# Patient Record
Sex: FEMALE | Race: OTHER | URBAN - METROPOLITAN AREA
[De-identification: names, ages, dates, MRNs, and addresses within clinical notes are randomized per-mention and may not be internally consistent; named-entity substitution may affect disease eponyms.]

---

## 2023-05-12 ENCOUNTER — OFFICE VISIT (OUTPATIENT)
Dept: FAMILY MEDICINE CLINIC | Facility: CLINIC | Age: 38
End: 2023-05-12
Payer: COMMERCIAL

## 2023-05-12 VITALS
BODY MASS INDEX: 31.65 KG/M2 | TEMPERATURE: 98 F | SYSTOLIC BLOOD PRESSURE: 121 MMHG | HEIGHT: 62 IN | HEART RATE: 75 BPM | WEIGHT: 172 LBS | RESPIRATION RATE: 16 BRPM | DIASTOLIC BLOOD PRESSURE: 74 MMHG | OXYGEN SATURATION: 100 %

## 2023-05-12 DIAGNOSIS — J32.9 RHINOSINUSITIS: Primary | ICD-10-CM

## 2023-05-12 DIAGNOSIS — J31.0 RHINOSINUSITIS: Primary | ICD-10-CM

## 2023-05-12 DIAGNOSIS — R05.1 ACUTE COUGH: ICD-10-CM

## 2023-05-12 LAB
CONTROL LINE PRESENT WITH A CLEAR BACKGROUND (YES/NO): YES YES/NO
KIT LOT #: NORMAL NUMERIC
STREP GRP A CUL-SCR: NEGATIVE

## 2023-05-12 PROCEDURE — 87880 STREP A ASSAY W/OPTIC: CPT | Performed by: NURSE PRACTITIONER

## 2023-05-12 PROCEDURE — 3008F BODY MASS INDEX DOCD: CPT | Performed by: NURSE PRACTITIONER

## 2023-05-12 PROCEDURE — 3078F DIAST BP <80 MM HG: CPT | Performed by: NURSE PRACTITIONER

## 2023-05-12 PROCEDURE — 3074F SYST BP LT 130 MM HG: CPT | Performed by: NURSE PRACTITIONER

## 2023-05-12 PROCEDURE — 99203 OFFICE O/P NEW LOW 30 MIN: CPT | Performed by: NURSE PRACTITIONER

## 2023-05-12 RX ORDER — AMOXICILLIN AND CLAVULANATE POTASSIUM 875; 125 MG/1; MG/1
1 TABLET, FILM COATED ORAL 2 TIMES DAILY
Qty: 14 TABLET | Refills: 0 | Status: SHIPPED | OUTPATIENT
Start: 2023-05-12 | End: 2023-05-19

## 2023-05-12 RX ORDER — LORATADINE 10 MG/1
CAPSULE, LIQUID FILLED ORAL
COMMUNITY

## 2023-05-12 RX ORDER — BENZONATATE 200 MG/1
CAPSULE ORAL
Qty: 20 CAPSULE | Refills: 0 | Status: SHIPPED | OUTPATIENT
Start: 2023-05-12

## 2023-07-07 ENCOUNTER — PATIENT MESSAGE (OUTPATIENT)
Dept: INTERNAL MEDICINE CLINIC | Facility: CLINIC | Age: 38
End: 2023-07-07

## 2023-07-07 ENCOUNTER — LAB ENCOUNTER (OUTPATIENT)
Dept: LAB | Age: 38
End: 2023-07-07
Payer: COMMERCIAL

## 2023-07-07 ENCOUNTER — OFFICE VISIT (OUTPATIENT)
Dept: INTERNAL MEDICINE CLINIC | Facility: CLINIC | Age: 38
End: 2023-07-07

## 2023-07-07 VITALS
DIASTOLIC BLOOD PRESSURE: 56 MMHG | SYSTOLIC BLOOD PRESSURE: 102 MMHG | BODY MASS INDEX: 31.28 KG/M2 | HEIGHT: 62 IN | OXYGEN SATURATION: 98 % | WEIGHT: 170 LBS | HEART RATE: 64 BPM

## 2023-07-07 DIAGNOSIS — Z00.00 ROUTINE GENERAL MEDICAL EXAMINATION AT A HEALTH CARE FACILITY: ICD-10-CM

## 2023-07-07 DIAGNOSIS — Z00.00 ROUTINE GENERAL MEDICAL EXAMINATION AT A HEALTH CARE FACILITY: Primary | ICD-10-CM

## 2023-07-07 PROBLEM — F41.1 GENERALIZED ANXIETY DISORDER: Status: ACTIVE | Noted: 2023-07-07

## 2023-07-07 LAB
ALBUMIN SERPL-MCNC: 3.8 G/DL (ref 3.4–5)
ALBUMIN/GLOB SERPL: 1.1 {RATIO} (ref 1–2)
ALP LIVER SERPL-CCNC: 80 U/L
ALT SERPL-CCNC: 61 U/L
ANION GAP SERPL CALC-SCNC: 5 MMOL/L (ref 0–18)
AST SERPL-CCNC: 36 U/L (ref 15–37)
BASOPHILS # BLD AUTO: 0.04 X10(3) UL (ref 0–0.2)
BASOPHILS NFR BLD AUTO: 0.7 %
BILIRUB SERPL-MCNC: 0.7 MG/DL (ref 0.1–2)
BUN BLD-MCNC: 15 MG/DL (ref 7–18)
BUN/CREAT SERPL: 28.8 (ref 10–20)
CALCIUM BLD-MCNC: 9 MG/DL (ref 8.5–10.1)
CHLORIDE SERPL-SCNC: 109 MMOL/L (ref 98–112)
CHOLEST SERPL-MCNC: 222 MG/DL (ref ?–200)
CO2 SERPL-SCNC: 26 MMOL/L (ref 21–32)
CREAT BLD-MCNC: 0.52 MG/DL
DEPRECATED RDW RBC AUTO: 38.5 FL (ref 35.1–46.3)
EOSINOPHIL # BLD AUTO: 0.1 X10(3) UL (ref 0–0.7)
EOSINOPHIL NFR BLD AUTO: 1.8 %
ERYTHROCYTE [DISTWIDTH] IN BLOOD BY AUTOMATED COUNT: 11.9 % (ref 11–15)
FASTING PATIENT LIPID ANSWER: YES
FASTING STATUS PATIENT QL REPORTED: YES
GFR SERPLBLD BASED ON 1.73 SQ M-ARVRAT: 121 ML/MIN/1.73M2 (ref 60–?)
GLOBULIN PLAS-MCNC: 3.5 G/DL (ref 2.8–4.4)
GLUCOSE BLD-MCNC: 98 MG/DL (ref 70–99)
HCT VFR BLD AUTO: 41.3 %
HDLC SERPL-MCNC: 57 MG/DL (ref 40–59)
HGB BLD-MCNC: 13.6 G/DL
IMM GRANULOCYTES # BLD AUTO: 0.01 X10(3) UL (ref 0–1)
IMM GRANULOCYTES NFR BLD: 0.2 %
LDLC SERPL CALC-MCNC: 142 MG/DL (ref ?–100)
LYMPHOCYTES # BLD AUTO: 1.81 X10(3) UL (ref 1–4)
LYMPHOCYTES NFR BLD AUTO: 33.5 %
MCH RBC QN AUTO: 29.1 PG (ref 26–34)
MCHC RBC AUTO-ENTMCNC: 32.9 G/DL (ref 31–37)
MCV RBC AUTO: 88.2 FL
MONOCYTES # BLD AUTO: 0.33 X10(3) UL (ref 0.1–1)
MONOCYTES NFR BLD AUTO: 6.1 %
NEUTROPHILS # BLD AUTO: 3.12 X10 (3) UL (ref 1.5–7.7)
NEUTROPHILS # BLD AUTO: 3.12 X10(3) UL (ref 1.5–7.7)
NEUTROPHILS NFR BLD AUTO: 57.7 %
NONHDLC SERPL-MCNC: 165 MG/DL (ref ?–130)
OSMOLALITY SERPL CALC.SUM OF ELEC: 291 MOSM/KG (ref 275–295)
PLATELET # BLD AUTO: 235 10(3)UL (ref 150–450)
POTASSIUM SERPL-SCNC: 4 MMOL/L (ref 3.5–5.1)
PROT SERPL-MCNC: 7.3 G/DL (ref 6.4–8.2)
RBC # BLD AUTO: 4.68 X10(6)UL
SODIUM SERPL-SCNC: 140 MMOL/L (ref 136–145)
TRIGL SERPL-MCNC: 130 MG/DL (ref 30–149)
TSI SER-ACNC: 1.61 MIU/ML (ref 0.36–3.74)
VLDLC SERPL CALC-MCNC: 24 MG/DL (ref 0–30)
WBC # BLD AUTO: 5.4 X10(3) UL (ref 4–11)

## 2023-07-07 PROCEDURE — 3074F SYST BP LT 130 MM HG: CPT

## 2023-07-07 PROCEDURE — 80061 LIPID PANEL: CPT

## 2023-07-07 PROCEDURE — 80053 COMPREHEN METABOLIC PANEL: CPT

## 2023-07-07 PROCEDURE — 3008F BODY MASS INDEX DOCD: CPT

## 2023-07-07 PROCEDURE — 36415 COLL VENOUS BLD VENIPUNCTURE: CPT

## 2023-07-07 PROCEDURE — 85025 COMPLETE CBC W/AUTO DIFF WBC: CPT

## 2023-07-07 PROCEDURE — 84443 ASSAY THYROID STIM HORMONE: CPT

## 2023-07-07 PROCEDURE — 3078F DIAST BP <80 MM HG: CPT

## 2023-07-07 PROCEDURE — 99385 PREV VISIT NEW AGE 18-39: CPT

## 2023-07-07 RX ORDER — LORATADINE 10 MG/1
10 TABLET ORAL DAILY
COMMUNITY

## 2023-07-07 RX ORDER — CITALOPRAM HYDROBROMIDE 10 MG/1
10 TABLET ORAL DAILY
COMMUNITY
Start: 2023-04-26

## 2023-07-08 DIAGNOSIS — E78.00 HIGH CHOLESTEROL: Primary | ICD-10-CM

## 2023-07-08 DIAGNOSIS — R79.89 ELEVATED LFTS: ICD-10-CM

## 2023-07-09 NOTE — TELEPHONE ENCOUNTER
From: Bolling Schlatter  To: LION Kim  Sent: 7/7/2023 5:08 PM CDT  Subject: Question regarding COMP METABOLIC PANEL (14)    Good afternoon Joanna Copping, was does the bun crea ratio mean. I saw that it was very high, also my glucose level almost on the line.

## 2023-11-22 ENCOUNTER — OFFICE VISIT (OUTPATIENT)
Dept: FAMILY MEDICINE CLINIC | Facility: CLINIC | Age: 38
End: 2023-11-22
Payer: COMMERCIAL

## 2023-11-22 ENCOUNTER — APPOINTMENT (OUTPATIENT)
Dept: CT IMAGING | Facility: HOSPITAL | Age: 38
End: 2023-11-22
Attending: EMERGENCY MEDICINE
Payer: COMMERCIAL

## 2023-11-22 ENCOUNTER — HOSPITAL ENCOUNTER (EMERGENCY)
Facility: HOSPITAL | Age: 38
Discharge: HOME | End: 2023-11-22
Attending: EMERGENCY MEDICINE
Payer: COMMERCIAL

## 2023-11-22 ENCOUNTER — APPOINTMENT (OUTPATIENT)
Dept: GENERAL RADIOLOGY | Facility: HOSPITAL | Age: 38
End: 2023-11-22
Attending: EMERGENCY MEDICINE
Payer: COMMERCIAL

## 2023-11-22 VITALS
HEART RATE: 70 BPM | SYSTOLIC BLOOD PRESSURE: 135 MMHG | BODY MASS INDEX: 31.28 KG/M2 | DIASTOLIC BLOOD PRESSURE: 70 MMHG | TEMPERATURE: 97 F | WEIGHT: 170 LBS | OXYGEN SATURATION: 96 % | RESPIRATION RATE: 18 BRPM | HEIGHT: 62 IN

## 2023-11-22 VITALS
SYSTOLIC BLOOD PRESSURE: 108 MMHG | WEIGHT: 170 LBS | BODY MASS INDEX: 32.1 KG/M2 | DIASTOLIC BLOOD PRESSURE: 78 MMHG | HEART RATE: 71 BPM | HEIGHT: 61 IN | TEMPERATURE: 98 F | RESPIRATION RATE: 20 BRPM | OXYGEN SATURATION: 98 %

## 2023-11-22 DIAGNOSIS — R10.11 RIGHT UPPER QUADRANT ABDOMINAL PAIN: Primary | ICD-10-CM

## 2023-11-22 DIAGNOSIS — R42 DIZZINESS: ICD-10-CM

## 2023-11-22 DIAGNOSIS — R11.0 NAUSEA: ICD-10-CM

## 2023-11-22 DIAGNOSIS — R07.9 RECURRENT CHEST PAIN: Primary | ICD-10-CM

## 2023-11-22 LAB
ALBUMIN SERPL-MCNC: 4.7 G/DL (ref 3.2–4.8)
ALP LIVER SERPL-CCNC: 82 U/L
ALT SERPL-CCNC: 57 U/L
ANION GAP SERPL CALC-SCNC: 5 MMOL/L (ref 0–18)
AST SERPL-CCNC: 39 U/L (ref ?–34)
B-HCG UR QL: NEGATIVE
BASOPHILS # BLD AUTO: 0.04 X10(3) UL (ref 0–0.2)
BASOPHILS NFR BLD AUTO: 0.5 %
BILIRUB DIRECT SERPL-MCNC: 0.2 MG/DL (ref ?–0.3)
BILIRUB SERPL-MCNC: 1 MG/DL (ref 0.3–1.2)
BUN BLD-MCNC: 11 MG/DL (ref 9–23)
BUN/CREAT SERPL: 22.9 (ref 10–20)
CALCIUM BLD-MCNC: 9.4 MG/DL (ref 8.7–10.4)
CHLORIDE SERPL-SCNC: 107 MMOL/L (ref 98–112)
CO2 SERPL-SCNC: 26 MMOL/L (ref 21–32)
CREAT BLD-MCNC: 0.48 MG/DL
D DIMER PPP FEU-MCNC: 1.03 UG/ML FEU (ref ?–0.5)
DEPRECATED RDW RBC AUTO: 37.7 FL (ref 35.1–46.3)
EGFRCR SERPLBLD CKD-EPI 2021: 123 ML/MIN/1.73M2 (ref 60–?)
EOSINOPHIL # BLD AUTO: 0.07 X10(3) UL (ref 0–0.7)
EOSINOPHIL NFR BLD AUTO: 1 %
ERYTHROCYTE [DISTWIDTH] IN BLOOD BY AUTOMATED COUNT: 11.8 % (ref 11–15)
GLUCOSE BLD-MCNC: 91 MG/DL (ref 70–99)
HCT VFR BLD AUTO: 42.4 %
HGB BLD-MCNC: 14.2 G/DL
IMM GRANULOCYTES # BLD AUTO: 0.02 X10(3) UL (ref 0–1)
IMM GRANULOCYTES NFR BLD: 0.3 %
INR BLD: 0.96 (ref 0.8–1.2)
LIPASE SERPL-CCNC: 35 U/L (ref 13–75)
LYMPHOCYTES # BLD AUTO: 2.25 X10(3) UL (ref 1–4)
LYMPHOCYTES NFR BLD AUTO: 30.7 %
MCH RBC QN AUTO: 29.1 PG (ref 26–34)
MCHC RBC AUTO-ENTMCNC: 33.5 G/DL (ref 31–37)
MCV RBC AUTO: 86.9 FL
MONOCYTES # BLD AUTO: 0.43 X10(3) UL (ref 0.1–1)
MONOCYTES NFR BLD AUTO: 5.9 %
NEUTROPHILS # BLD AUTO: 4.53 X10 (3) UL (ref 1.5–7.7)
NEUTROPHILS # BLD AUTO: 4.53 X10(3) UL (ref 1.5–7.7)
NEUTROPHILS NFR BLD AUTO: 61.6 %
OSMOLALITY SERPL CALC.SUM OF ELEC: 285 MOSM/KG (ref 275–295)
PLATELET # BLD AUTO: 234 10(3)UL (ref 150–450)
POTASSIUM SERPL-SCNC: 3.6 MMOL/L (ref 3.5–5.1)
PROT SERPL-MCNC: 7.7 G/DL (ref 5.7–8.2)
PROTHROMBIN TIME: 13.3 SECONDS (ref 11.6–14.8)
RBC # BLD AUTO: 4.88 X10(6)UL
SODIUM SERPL-SCNC: 138 MMOL/L (ref 136–145)
TROPONIN I SERPL HS-MCNC: <3 NG/L
WBC # BLD AUTO: 7.3 X10(3) UL (ref 4–11)

## 2023-11-22 PROCEDURE — 84484 ASSAY OF TROPONIN QUANT: CPT | Performed by: EMERGENCY MEDICINE

## 2023-11-22 PROCEDURE — 81025 URINE PREGNANCY TEST: CPT

## 2023-11-22 PROCEDURE — 85025 COMPLETE CBC W/AUTO DIFF WBC: CPT | Performed by: EMERGENCY MEDICINE

## 2023-11-22 PROCEDURE — 83690 ASSAY OF LIPASE: CPT | Performed by: EMERGENCY MEDICINE

## 2023-11-22 PROCEDURE — 80048 BASIC METABOLIC PNL TOTAL CA: CPT | Performed by: EMERGENCY MEDICINE

## 2023-11-22 PROCEDURE — 93010 ELECTROCARDIOGRAM REPORT: CPT

## 2023-11-22 PROCEDURE — 80076 HEPATIC FUNCTION PANEL: CPT | Performed by: EMERGENCY MEDICINE

## 2023-11-22 PROCEDURE — 71275 CT ANGIOGRAPHY CHEST: CPT | Performed by: EMERGENCY MEDICINE

## 2023-11-22 PROCEDURE — 85379 FIBRIN DEGRADATION QUANT: CPT | Performed by: EMERGENCY MEDICINE

## 2023-11-22 PROCEDURE — 74175 CTA ABDOMEN W/CONTRAST: CPT | Performed by: EMERGENCY MEDICINE

## 2023-11-22 PROCEDURE — 93005 ELECTROCARDIOGRAM TRACING: CPT

## 2023-11-22 PROCEDURE — 99285 EMERGENCY DEPT VISIT HI MDM: CPT

## 2023-11-22 PROCEDURE — 36415 COLL VENOUS BLD VENIPUNCTURE: CPT

## 2023-11-22 PROCEDURE — 71046 X-RAY EXAM CHEST 2 VIEWS: CPT | Performed by: EMERGENCY MEDICINE

## 2023-11-22 PROCEDURE — 85610 PROTHROMBIN TIME: CPT | Performed by: EMERGENCY MEDICINE

## 2023-11-22 NOTE — DISCHARGE INSTRUCTIONS
Return to emergency department for any worsening symptoms including but not limited to worsening chest pain, shortness of breath, worsening symptoms with exertion, lower extremity swelling, weakness, numbness, abdominal pain, etc. Please follow with your primary care provider in the next few days for reevaluation of your symptoms. Return to emergency department for any worsening symptoms including but not limited to worsening chest pain, shortness of breath, worsening symptoms with exertion, lower extremity swelling, weakness, numbness, abdominal pain, etc. Please follow with your primary care provider in the next few days for reevaluation of your symptoms. The Emergency Department is not intended to be a substitute for an effort to provide complete medical care. The imaging, if any, have often been interpreted on a preliminary basis pending final reading by the radiologist. If your blood pressure was greater than 140/90, please have this blood pressure rechecked by your primary care provider in the next several days.

## 2023-11-24 LAB
ATRIAL RATE: 61 BPM
P AXIS: 32 DEGREES
P-R INTERVAL: 146 MS
Q-T INTERVAL: 466 MS
QRS DURATION: 96 MS
QTC CALCULATION (BEZET): 469 MS
R AXIS: -14 DEGREES
T AXIS: 8 DEGREES
VENTRICULAR RATE: 61 BPM

## 2023-11-28 ENCOUNTER — OFFICE VISIT (OUTPATIENT)
Dept: FAMILY MEDICINE CLINIC | Facility: CLINIC | Age: 38
End: 2023-11-28

## 2023-11-28 VITALS
WEIGHT: 171.63 LBS | SYSTOLIC BLOOD PRESSURE: 100 MMHG | TEMPERATURE: 99 F | DIASTOLIC BLOOD PRESSURE: 62 MMHG | HEIGHT: 61 IN | BODY MASS INDEX: 32.4 KG/M2 | OXYGEN SATURATION: 98 % | HEART RATE: 67 BPM

## 2023-11-28 DIAGNOSIS — I51.7 CARDIOMEGALY: ICD-10-CM

## 2023-11-28 DIAGNOSIS — R07.9 CHEST PAIN, UNSPECIFIED TYPE: Primary | ICD-10-CM

## 2023-11-28 DIAGNOSIS — I27.20 PULMONARY HTN (HCC): ICD-10-CM

## 2023-11-28 DIAGNOSIS — R93.89 ABNORMAL CHEST CT: ICD-10-CM

## 2023-11-28 PROCEDURE — 99213 OFFICE O/P EST LOW 20 MIN: CPT

## 2023-11-28 PROCEDURE — 3008F BODY MASS INDEX DOCD: CPT

## 2023-11-28 PROCEDURE — 3074F SYST BP LT 130 MM HG: CPT

## 2023-11-28 PROCEDURE — 3078F DIAST BP <80 MM HG: CPT

## 2023-11-28 RX ORDER — IBUPROFEN 800 MG/1
800 TABLET ORAL EVERY 8 HOURS PRN
Qty: 15 TABLET | Refills: 0 | Status: SHIPPED | OUTPATIENT
Start: 2023-11-28

## 2023-12-06 ENCOUNTER — PATIENT MESSAGE (OUTPATIENT)
Dept: FAMILY MEDICINE CLINIC | Facility: CLINIC | Age: 38
End: 2023-12-06

## 2023-12-07 NOTE — TELEPHONE ENCOUNTER
Jennifer Elias, April, RN 12/7/2023 11:45 AM CST        ----- Message -----  From: Troy Stanley  Sent: 12/6/2023 9:58 AM CST  To: Em Triage Support  Subject: Referral     Good morning Dinosaur i scheduled my ultrasound with Antonietta Hurst for December 13. Just want to make sure the referral was sent to the office before my appointment. Thank you.

## 2023-12-19 ENCOUNTER — MED REC SCAN ONLY (OUTPATIENT)
Dept: FAMILY MEDICINE CLINIC | Facility: CLINIC | Age: 38
End: 2023-12-19

## 2024-01-10 ENCOUNTER — OFFICE VISIT (OUTPATIENT)
Dept: FAMILY MEDICINE CLINIC | Facility: CLINIC | Age: 39
End: 2024-01-10

## 2024-01-10 ENCOUNTER — LAB ENCOUNTER (OUTPATIENT)
Dept: LAB | Age: 39
End: 2024-01-10
Payer: COMMERCIAL

## 2024-01-10 VITALS
BODY MASS INDEX: 31.53 KG/M2 | RESPIRATION RATE: 18 BRPM | OXYGEN SATURATION: 97 % | HEIGHT: 61 IN | SYSTOLIC BLOOD PRESSURE: 110 MMHG | HEART RATE: 67 BPM | WEIGHT: 167 LBS | TEMPERATURE: 98 F | DIASTOLIC BLOOD PRESSURE: 76 MMHG

## 2024-01-10 DIAGNOSIS — R74.8 ELEVATED LIVER ENZYMES: Primary | ICD-10-CM

## 2024-01-10 DIAGNOSIS — R79.89 ELEVATED LFTS: ICD-10-CM

## 2024-01-10 DIAGNOSIS — R74.8 ELEVATED LIVER ENZYMES: ICD-10-CM

## 2024-01-10 DIAGNOSIS — K76.0 FATTY LIVER: ICD-10-CM

## 2024-01-10 DIAGNOSIS — E78.00 HIGH CHOLESTEROL: ICD-10-CM

## 2024-01-10 PROBLEM — R07.9 CHEST PAIN: Status: ACTIVE | Noted: 2023-12-13

## 2024-01-10 PROBLEM — I51.7 ENLARGED HEART: Status: ACTIVE | Noted: 2023-12-13

## 2024-01-10 LAB
ALBUMIN SERPL-MCNC: 4.5 G/DL (ref 3.2–4.8)
ALBUMIN/GLOB SERPL: 1.5 {RATIO} (ref 1–2)
ALP LIVER SERPL-CCNC: 83 U/L
ALT SERPL-CCNC: 50 U/L
ANION GAP SERPL CALC-SCNC: 8 MMOL/L (ref 0–18)
AST SERPL-CCNC: 36 U/L (ref ?–34)
BILIRUB DIRECT SERPL-MCNC: 0.2 MG/DL (ref ?–0.3)
BILIRUB SERPL-MCNC: 0.6 MG/DL (ref 0.3–1.2)
BUN BLD-MCNC: 12 MG/DL (ref 9–23)
BUN/CREAT SERPL: 21.8 (ref 10–20)
CALCIUM BLD-MCNC: 9.4 MG/DL (ref 8.7–10.4)
CHLORIDE SERPL-SCNC: 106 MMOL/L (ref 98–112)
CHOLEST SERPL-MCNC: 200 MG/DL (ref ?–200)
CO2 SERPL-SCNC: 27 MMOL/L (ref 21–32)
CREAT BLD-MCNC: 0.55 MG/DL
EGFRCR SERPLBLD CKD-EPI 2021: 119 ML/MIN/1.73M2 (ref 60–?)
FASTING PATIENT LIPID ANSWER: YES
FASTING STATUS PATIENT QL REPORTED: YES
GLOBULIN PLAS-MCNC: 3 G/DL (ref 2.8–4.4)
GLUCOSE BLD-MCNC: 96 MG/DL (ref 70–99)
HAV IGM SER QL: NONREACTIVE
HBV CORE IGM SER QL: NONREACTIVE
HBV SURFACE AG SERPL QL IA: NONREACTIVE
HCV AB SERPL QL IA: NONREACTIVE
HDLC SERPL-MCNC: 59 MG/DL (ref 40–59)
LDLC SERPL CALC-MCNC: 127 MG/DL (ref ?–100)
NONHDLC SERPL-MCNC: 141 MG/DL (ref ?–130)
OSMOLALITY SERPL CALC.SUM OF ELEC: 292 MOSM/KG (ref 275–295)
POTASSIUM SERPL-SCNC: 4 MMOL/L (ref 3.5–5.1)
PROT SERPL-MCNC: 7.5 G/DL (ref 5.7–8.2)
SODIUM SERPL-SCNC: 141 MMOL/L (ref 136–145)
TRIGL SERPL-MCNC: 80 MG/DL (ref 30–149)
VLDLC SERPL CALC-MCNC: 14 MG/DL (ref 0–30)

## 2024-01-10 PROCEDURE — 99213 OFFICE O/P EST LOW 20 MIN: CPT

## 2024-01-10 PROCEDURE — 3078F DIAST BP <80 MM HG: CPT

## 2024-01-10 PROCEDURE — 36415 COLL VENOUS BLD VENIPUNCTURE: CPT

## 2024-01-10 PROCEDURE — 80074 ACUTE HEPATITIS PANEL: CPT

## 2024-01-10 PROCEDURE — 80053 COMPREHEN METABOLIC PANEL: CPT

## 2024-01-10 PROCEDURE — 80061 LIPID PANEL: CPT

## 2024-01-10 PROCEDURE — 82248 BILIRUBIN DIRECT: CPT

## 2024-01-10 PROCEDURE — 3008F BODY MASS INDEX DOCD: CPT

## 2024-01-10 PROCEDURE — 3074F SYST BP LT 130 MM HG: CPT

## 2024-01-10 NOTE — PROGRESS NOTES
HPI:    Patient ID: Marissa Joshi is a 38 year old female.    HPI     Patient here in office concerned about CTA abdomen/chest showing fatty liver. Liver enzymes also 11/22/23. Last lipid panel completed on 7/7/23 showed elevated total cholesterol and LDL. Per patient, she has eating low fat diet. Would like labs rechecked today. Denies right upper quadrant pain.       Review of Systems   Constitutional: Negative.    Respiratory: Negative.     Cardiovascular: Negative.    Gastrointestinal: Negative.  Negative for abdominal pain.   Skin: Negative.    Neurological: Negative.    Psychiatric/Behavioral: Negative.              Current Outpatient Medications   Medication Sig Dispense Refill    ibuprofen 800 MG Oral Tab Take 1 tablet (800 mg total) by mouth every 8 (eight) hours as needed for Pain. (Patient not taking: Reported on 1/10/2024) 15 tablet 0    citalopram 10 MG Oral Tab Take 1 tablet (10 mg total) by mouth daily. (Patient not taking: Reported on 11/28/2023)      loratadine 10 MG Oral Tab Take 1 tablet (10 mg total) by mouth daily. (Patient not taking: Reported on 11/28/2023)      Loratadine (CLARITIN) 10 MG Oral Cap Take by mouth. (Patient not taking: Reported on 11/28/2023)      Citalopram Hydrobromide (CELEXA OR) Take by mouth. (Patient not taking: Reported on 1/10/2024)      benzonatate 200 MG Oral Cap Take 1 capsule PO every 8 hours PRN cough (Patient not taking: Reported on 11/28/2023) 20 capsule 0     Allergies:  Allergies   Allergen Reactions    Seasonal ITCHING      /76   Pulse 67   Temp 98.2 °F (36.8 °C) (Oral)   Resp 18   Ht 5' 1\" (1.549 m)   Wt 167 lb (75.8 kg)   LMP 01/08/2024 (Exact Date)   SpO2 97%   BMI 31.55 kg/m²   Body mass index is 31.55 kg/m².  PHYSICAL EXAM:   Physical Exam  Vitals reviewed.   Constitutional:       General: She is not in acute distress.     Appearance: Normal appearance. She is not ill-appearing.   Cardiovascular:      Rate and Rhythm: Normal rate and regular  rhythm.      Heart sounds: Normal heart sounds. No murmur heard.     No friction rub. No gallop.   Pulmonary:      Effort: Pulmonary effort is normal. No respiratory distress.      Breath sounds: Normal breath sounds. No stridor. No wheezing, rhonchi or rales.   Chest:      Chest wall: No tenderness.   Abdominal:      General: Bowel sounds are normal. There is no distension.      Palpations: Abdomen is soft. There is no mass.      Tenderness: There is no abdominal tenderness. There is no right CVA tenderness, left CVA tenderness, guarding or rebound.      Hernia: No hernia is present.   Skin:     General: Skin is warm.      Findings: No rash.   Neurological:      General: No focal deficit present.      Mental Status: She is alert and oriented to person, place, and time.   Psychiatric:         Mood and Affect: Mood normal.         Behavior: Behavior normal.         Thought Content: Thought content normal.         Judgment: Judgment normal.                ASSESSMENT/PLAN:   1. Elevated liver enzymes  - Hepatic Function Panel (7); Future  - Lipid Panel; Future  - Hepatitis Panel, Acute (4); Future  - Gastro Referral - In Network    2. Fatty liver  - Hepatic Function Panel (7); Future  - Lipid Panel; Future  - Hepatitis Panel, Acute (4); Future  - Gastro Referral - In Network      Orders Placed This Encounter   Procedures    Hepatic Function Panel (7)    Lipid Panel    Hepatitis Panel, Acute (4)       Meds This Visit:  Requested Prescriptions      No prescriptions requested or ordered in this encounter       Imaging & Referrals:  [facility] - INTERNAL         ID#[id number]

## 2024-05-03 NOTE — TELEPHONE ENCOUNTER
Rx on med hx , pt requesting   Refill passed per [facility] protocol.      Requested Prescriptions   Pending Prescriptions Disp Refills    citalopram 10 MG Oral Tab  0     Sig: Take 1 tablet (10 mg total) by mouth daily.       Psychiatric Non-Scheduled (Anti-Anxiety) Passed - 5/2/2024 11:50 PM        Passed - In person appointment or virtual visit in the past 6 mos or appointment in next 3 mos     Recent Outpatient Visits              3 months ago Elevated liver enzymes    [facility], Jane Dean APRN    Office Visit    5 months ago [facility], [facility]    [facility]    5 months ago Right upper quadrant abdominal [facility] [place] L[place]    Office Visit    10 months ago Routine general medical examination at a health care facility    [facility] [place] [facility]    Office Visit    11 months ago Rhinosinusitis    [facility] [place]    Office Visit                      Passed - Depression Screening completed within the past 12 months                  Recent Outpatient Visits              3 months ago Elevated liver enzymes    [facility] [place] [facility]    Office Visit    5 months ago Chest pain, unspecified type    [facility]    [facility] Visit    5 months ago Right upper quadrant abdominal pain    [facility]    Office Visit    10 months ago Routine general medical examination at a health care facility    [facility] [place]     Office Visit    11 months ago Rhinosinusitis    [facility], Margoth Lozano APN    Office Visit

## 2024-05-04 RX ORDER — CITALOPRAM HYDROBROMIDE 10 MG/1
10 TABLET ORAL DAILY
Qty: 90 TABLET | Refills: 0 | Status: SHIPPED | OUTPATIENT
Start: 2024-05-04 | End: 2024-07-24

## 2024-05-15 ENCOUNTER — OFFICE VISIT (OUTPATIENT)
Dept: FAMILY MEDICINE CLINIC | Facility: CLINIC | Age: 39
End: 2024-05-15

## 2024-05-15 VITALS
OXYGEN SATURATION: 96 % | HEART RATE: 74 BPM | SYSTOLIC BLOOD PRESSURE: 123 MMHG | HEIGHT: 61 IN | BODY MASS INDEX: 31.53 KG/M2 | RESPIRATION RATE: 18 BRPM | WEIGHT: 167 LBS | DIASTOLIC BLOOD PRESSURE: 68 MMHG | TEMPERATURE: 99 F

## 2024-05-15 DIAGNOSIS — S80.269A: Primary | ICD-10-CM
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
## 2024-05-15 PROCEDURE — 99213 OFFICE O/P EST LOW 20 MIN: CPT | Performed by: NURSE PRACTITIONER

## 2024-05-15 RX ORDER — TRIAMCINOLONE ACETONIDE 5 MG/G
CREAM TOPICAL
Qty: 30 G | Refills: 0 | Status: SHIPPED | OUTPATIENT
Start: 2024-05-15

## 2024-05-15 NOTE — PROGRESS NOTES
CHIEF COMPLAINT:     Chief Complaint   Patient presents with    Rash     Rash on right knee        HPI:     Marissa Joshi is a 38 year old female who presents with concerns of rash/skin problem localized to right knee.  Noticed it just after weeding outdoors at home.  There are now several spots on the right knee.  She does not know of any exposure to poisonous plants, did not witness any insect bites.  She took benadryl and applied benadryl cream with minimal relief.  Site is very itchy, some burning, denies pain.  No rashes/lesions elsewhere.  Denies fever, swelling, or systemic symptoms.      Current Outpatient Medications   Medication Sig Dispense Refill    triamcinolone 0.5 % External Cream Apply to affected area of knee BID until resolved, do not use longer than 2 weeks 30 g 0    citalopram 10 MG Oral Tab Take 1 tablet (10 mg total) by mouth daily. 90 tablet 0    ibuprofen 800 MG Oral Tab Take 1 tablet (800 mg total) by mouth every 8 (eight) hours as needed for Pain. (Patient not taking: Reported on 1/10/2024) 15 tablet 0    loratadine 10 MG Oral Tab Take 1 tablet (10 mg total) by mouth daily. (Patient not taking: Reported on 11/28/2023)      Loratadine (CLARITIN) 10 MG Oral Cap Take by mouth. (Patient not taking: Reported on 11/28/2023)      benzonatate 200 MG Oral Cap Take 1 capsule PO every 8 hours PRN cough (Patient not taking: Reported on 11/28/2023) 20 capsule 0      History reviewed. No pertinent past medical history.   Social History:  Social History     Socioeconomic History    Marital status:    Tobacco Use    Smoking status: Never    Smokeless tobacco: Never   Vaping Use    Vaping status: Never Used   Substance and Sexual Activity    Alcohol use: Not Currently    Drug use: Never   Social History Narrative    ** Merged History Encounter **             REVIEW OF SYSTEMS:   GENERAL: feels well otherwise, no fever, no chills.  SKIN: as above.  CHEST: no chest pains, no palpitations.  LUNGS:  denies shortness of breath with exertion or rest. No wheezing, no cough.  LYMPH: No enlargement of the lymph nodes.  MUSC/SKEL: no joint swelling, no joint stiffness.  CARDIOVASCULAR: denies chest pain on exertion or rest.  GI: no nausea, no vomiting or abdominal pain  NEURO: no abnormal sensation, no tingling of the skin or numbness.    EXAM:   /68 (BP Location: Left arm, Patient Position: Sitting, Cuff Size: adult)   Pulse 74   Temp 98.6 °F (37 °C)   Resp 18   Ht 5' 1\" (1.549 m)   Wt 167 lb (75.8 kg)   LMP 05/01/2024 (Exact Date)   SpO2 96%   BMI 31.55 kg/m²   GENERAL:Well-appearing, well developed, well nourished, and in no apparent distress  SKIN: +Several erythematous macules to right knee.  Pruritic, non-tender.  See picture below.  LUNGS: clear to auscultation bilaterally, no wheezes or rhonchi. Breathing is non labored.  CARDIO: RRR without murmur  EXTREMITIES: no cyanosis, clubbing or edema.  Cap refill brisk- less than 2 seconds.   LYMPH: No lymphadenopathy.          ASSESSMENT AND PLAN:     ASSESSMENT:   Encounter Diagnosis   Name Primary?    Insect bite of knee with local reaction, initial encounter Yes       PLAN:   - Exam c/w insect bites.  - Medication as below.  - Skin care discussed with patient.   - PO antihistamine PRN.  - Instructions and Comfort Care as listed in Patient Instructions.    - The patient was advised to return in 3 days if sx's persist or worsen.  Advised ER if ever new systemic symptoms/fever or streaking from wound.  - The patient indicates understanding of these issues and agrees to the plan.    Requested Prescriptions     Signed Prescriptions Disp Refills    triamcinolone 0.5 % External Cream 30 g 0     Sig: Apply to affected area of knee BID until resolved, do not use longer than 2 weeks     Medication instructions/side effects reviewed.  Pt verbalizes understanding.  There are no Patient Instructions on file for this visit.

## 2024-07-24 ENCOUNTER — OFFICE VISIT (OUTPATIENT)
Dept: FAMILY MEDICINE CLINIC | Facility: CLINIC | Age: 39
End: 2024-07-24
Payer: COMMERCIAL

## 2024-07-24 VITALS
WEIGHT: 167.19 LBS | SYSTOLIC BLOOD PRESSURE: 106 MMHG | HEART RATE: 67 BPM | RESPIRATION RATE: 18 BRPM | HEIGHT: 61 IN | BODY MASS INDEX: 31.57 KG/M2 | DIASTOLIC BLOOD PRESSURE: 66 MMHG | OXYGEN SATURATION: 98 %

## 2024-07-24 DIAGNOSIS — R42 LIGHTHEADEDNESS: ICD-10-CM

## 2024-07-24 DIAGNOSIS — K13.79 MOUTH SORE: ICD-10-CM

## 2024-07-24 DIAGNOSIS — Z12.31 ENCOUNTER FOR SCREENING MAMMOGRAM FOR MALIGNANT NEOPLASM OF BREAST: ICD-10-CM

## 2024-07-24 DIAGNOSIS — E66.9 CLASS 1 OBESITY WITHOUT SERIOUS COMORBIDITY WITH BODY MASS INDEX (BMI) OF 31.0 TO 31.9 IN ADULT, UNSPECIFIED OBESITY TYPE: ICD-10-CM

## 2024-07-24 DIAGNOSIS — L91.8 ACQUIRED SKIN TAG: Primary | ICD-10-CM

## 2024-07-24 DIAGNOSIS — Z00.00 ROUTINE PHYSICAL EXAMINATION: ICD-10-CM

## 2024-07-24 PROCEDURE — 99396 PREV VISIT EST AGE 40-64: CPT

## 2024-07-24 RX ORDER — CITALOPRAM HYDROBROMIDE 10 MG/1
10 TABLET ORAL DAILY
COMMUNITY

## 2024-07-24 NOTE — PROGRESS NOTES
HPI:    Patient ID: Marissa Joshi is a 38 year old female.    HPI  Chief Complaint   Patient presents with    Physical     Annual px previous 07/07/23    Lab     Blood work      Patient here in office for physical. Last pap completed in January 2022 and result was normal. Reports regular menstrual cycles. Denies dysmenorrhea or abnormal vaginal bleeding.     Complains of intermittent dizziness, unsure if related to low blood sugar. States when she eats, dizziness improves.     Concerned about small bump on hard palate. Denies lip/tongue swelling.     Review of Systems   Constitutional: Negative.  Negative for fever.   HENT: Negative.  Negative for ear pain and sore throat.    Eyes: Negative.  Negative for visual disturbance.   Respiratory: Negative.  Negative for cough and shortness of breath.    Cardiovascular: Negative.  Negative for chest pain and palpitations.   Gastrointestinal: Negative.  Negative for abdominal pain, blood in stool, constipation and diarrhea.   Genitourinary: Negative.  Negative for dysuria, frequency and hematuria.   Musculoskeletal: Negative.  Negative for arthralgias and myalgias.   Skin: Negative.  Negative for rash.   Neurological:  Positive for dizziness. Negative for facial asymmetry and headaches.   Psychiatric/Behavioral: Negative.         /66 (BP Location: Right arm, Patient Position: Sitting, Cuff Size: adult)   Pulse 67   Resp 18   Ht 5' 1\" (1.549 m)   Wt 167 lb 3.2 oz (75.8 kg)   LMP 06/26/2024 (Exact Date)   SpO2 98%   BMI 31.59 kg/m²     History reviewed. No pertinent past medical history.  History reviewed. No pertinent surgical history.  Social History     Socioeconomic History    Marital status:      Spouse name: Not on file    Number of children: Not on file    Years of education: Not on file    Highest education level: Not on file   Occupational History    Not on file   Tobacco Use    Smoking status: Never    Smokeless tobacco: Never   Vaping Use     Vaping status: Never Used   Substance and Sexual Activity    Alcohol use: Not Currently    Drug use: Never    Sexual activity: Not on file   Other Topics Concern    Not on file   Social History Narrative    ** Merged History Encounter **          Social Determinants of Health     Financial Resource Strain: Not on file   Food Insecurity: Not on file   Transportation Needs: Not on file   Physical Activity: Not on file   Stress: Not on file   Social Connections: Not on file   Housing Stability: Not on file     Family History   Problem Relation Age of Onset    Diabetes Mother     Diabetes Father     Diabetes Sister     Diabetes Brother     Asthma Daughter        Immunization History   Administered Date(s) Administered    Covid-19 Vaccine Pfizer 30 mcg/0.3 ml 04/13/2021, 05/08/2021    FLUZONE 6 months and older PFS 0.5 ml (09044) 09/13/2016, 11/02/2017, 01/08/2019, 01/30/2020, 09/17/2020, 01/19/2022, 12/02/2022    TDAP 06/27/2016       Health Maintenance   Topic Date Due    Mammogram  Never done    COVID-19 Vaccine (3 - 2023-24 season) 09/01/2023    Annual Physical  07/07/2024    Pap Smear  01/19/2025    Influenza Vaccine (1) 10/01/2024    DTaP,Tdap,and Td Vaccines (2 - Td or Tdap) 06/27/2026    Annual Depression Screening  Completed    Pneumococcal Vaccine: Birth to 64yrs  Aged Out          Current Outpatient Medications   Medication Sig Dispense Refill    citalopram 10 MG Oral Tab Take 1 tablet (10 mg total) by mouth daily.       Allergies:  Allergies   Allergen Reactions    Seasonal ITCHING      PHYSICAL EXAM:     Chief Complaint   Patient presents with    Physical     Annual px previous 07/07/23    Lab     Blood work       Physical Exam  Vitals reviewed.   Constitutional:       General: She is not in acute distress.     Appearance: Normal appearance. She is well-developed. She is not ill-appearing.   HENT:      Head: Normocephalic and atraumatic.      Right Ear: Tympanic membrane, ear canal and external ear normal.  There is no impacted cerumen.      Left Ear: Tympanic membrane, ear canal and external ear normal. There is no impacted cerumen.      Nose: Nose normal. No congestion.      Mouth/Throat:      Mouth: Mucous membranes are moist.      Pharynx: Oropharynx is clear. No oropharyngeal exudate or posterior oropharyngeal erythema.      Comments: Small papule on hard palate   Eyes:      General:         Right eye: No discharge.         Left eye: No discharge.      Extraocular Movements: Extraocular movements intact.      Conjunctiva/sclera: Conjunctivae normal.      Pupils: Pupils are equal, round, and reactive to light.   Cardiovascular:      Rate and Rhythm: Normal rate and regular rhythm.      Heart sounds: Normal heart sounds. No murmur heard.     No friction rub. No gallop.   Pulmonary:      Effort: Pulmonary effort is normal. No respiratory distress.      Breath sounds: Normal breath sounds. No stridor. No wheezing, rhonchi or rales.   Chest:      Chest wall: No tenderness.   Abdominal:      General: Bowel sounds are normal. There is no distension.      Palpations: Abdomen is soft. There is no mass.      Tenderness: There is no abdominal tenderness. There is no right CVA tenderness, left CVA tenderness, guarding or rebound.      Hernia: No hernia is present.   Genitourinary:     General: Normal vulva.      Vagina: Normal.   Musculoskeletal:         General: No tenderness. Normal range of motion.      Cervical back: Normal range of motion and neck supple.   Lymphadenopathy:      Cervical: No cervical adenopathy.   Skin:     General: Skin is warm and dry.      Comments: Skin tags base of anterior neck    Neurological:      General: No focal deficit present.      Mental Status: She is alert and oriented to person, place, and time.      Cranial Nerves: No cranial nerve deficit.      Sensory: No sensory deficit.      Motor: No weakness.      Deep Tendon Reflexes: Reflexes are normal and symmetric.   Psychiatric:         Mood  and Affect: Mood normal.         Behavior: Behavior normal.         Thought Content: Thought content normal.         Judgment: Judgment normal.                ASSESSMENT/PLAN:     Return yearly for physicals  Follow up with dentist every 6 months  Follow up with eye doctor yearly  Recommend aerobic exercise for at least 30mins 5 days a week  Yearly flu shot  Tetanus booster every 10 years (Tdap/ Td)  Labs ordered/ or reviewed if done prior to appointment     Encounter Diagnoses   Name Primary?    Acquired skin tag Yes    Routine physical examination     Class 1 obesity without serious comorbidity with body mass index (BMI) of 31.0 to 31.9 in adult, unspecified obesity type     Mouth sore     Lightheadedness     Encounter for screening mammogram for malignant neoplasm of breast        1. Acquired skin tag  - Hemoglobin A1C [E]; Future    2. Routine physical examination  - CBC With Differential With Platelet; Future  - Comp Metabolic Panel (14); Future  - Lipid Panel; Future  - TSH W Reflex To Free T4 [E]; Future    3. Class 1 obesity without serious comorbidity with body mass index (BMI) of 31.0 to 31.9 in adult, unspecified obesity type  - Hemoglobin A1C [E]; Future    4. Mouth sore  - Suspect allergy vs other  - Advised patient to use Sensodyne toothpaste and alcohol free mouth wash  - Avoid any spicy/acidic foods and eat soft diet to see if sore improves     5. Lightheadedness  - Ferritin [E]; Future  - Iron And Tibc [E]; Future    6. Encounter for screening mammogram for malignant neoplasm of breast  - [facility] JACINTO 2D+3D SCREENING BILAT (CPT=77067/50219); Future      Orders Placed This Encounter   Procedures    CBC With Differential With Platelet    Comp Metabolic Panel (14)    Lipid Panel    TSH W Reflex To Free T4 [E]    Ferritin [E]    Iron And Tibc [E]    Hemoglobin A1C [E]       The above note was creating using Dragon speech recognition technology. Please excuse any typos    Meds This Visit:  Requested  Prescriptions      No prescriptions requested or ordered in this encounter       Imaging & Referrals:  [facility] JACINTO 2D+3D SCREENING BILAT (CPT=77067/20244)         Jane Alves, APRN

## 2024-07-31 ENCOUNTER — LAB ENCOUNTER (OUTPATIENT)
Dept: LAB | Age: 39
End: 2024-07-31
Payer: COMMERCIAL

## 2024-07-31 DIAGNOSIS — K13.79 MOUTH SORE: ICD-10-CM

## 2024-07-31 DIAGNOSIS — R42 LIGHTHEADEDNESS: ICD-10-CM

## 2024-07-31 DIAGNOSIS — Z00.00 ROUTINE PHYSICAL EXAMINATION: ICD-10-CM

## 2024-07-31 DIAGNOSIS — L91.8 ACQUIRED SKIN TAG: ICD-10-CM

## 2024-07-31 DIAGNOSIS — E66.9 CLASS 1 OBESITY WITHOUT SERIOUS COMORBIDITY WITH BODY MASS INDEX (BMI) OF 31.0 TO 31.9 IN ADULT, UNSPECIFIED OBESITY TYPE: ICD-10-CM

## 2024-07-31 LAB
ALBUMIN SERPL-MCNC: 4.2 G/DL (ref 3.2–4.8)
ALBUMIN/GLOB SERPL: 1.4 {RATIO} (ref 1–2)
ALP LIVER SERPL-CCNC: 82 U/L
ALT SERPL-CCNC: 25 U/L
ANION GAP SERPL CALC-SCNC: 6 MMOL/L (ref 0–18)
AST SERPL-CCNC: 22 U/L (ref ?–34)
BASOPHILS # BLD AUTO: 0.03 X10(3) UL (ref 0–0.2)
BASOPHILS NFR BLD AUTO: 0.5 %
BILIRUB SERPL-MCNC: 0.7 MG/DL (ref 0.3–1.2)
BUN BLD-MCNC: 12 MG/DL (ref 9–23)
BUN/CREAT SERPL: 20.7 (ref 10–20)
CALCIUM BLD-MCNC: 9.1 MG/DL (ref 8.7–10.4)
CHLORIDE SERPL-SCNC: 109 MMOL/L (ref 98–112)
CHOLEST SERPL-MCNC: 197 MG/DL (ref ?–200)
CO2 SERPL-SCNC: 28 MMOL/L (ref 21–32)
CREAT BLD-MCNC: 0.58 MG/DL
DEPRECATED HBV CORE AB SER IA-ACNC: 23.7 NG/ML
DEPRECATED RDW RBC AUTO: 38.3 FL (ref 35.1–46.3)
EGFRCR SERPLBLD CKD-EPI 2021: 117 ML/MIN/1.73M2 (ref 60–?)
EOSINOPHIL # BLD AUTO: 0.23 X10(3) UL (ref 0–0.7)
EOSINOPHIL NFR BLD AUTO: 4.1 %
ERYTHROCYTE [DISTWIDTH] IN BLOOD BY AUTOMATED COUNT: 11.9 % (ref 11–15)
EST. AVERAGE GLUCOSE BLD GHB EST-MCNC: 111 MG/DL (ref 68–126)
FASTING PATIENT LIPID ANSWER: YES
FASTING STATUS PATIENT QL REPORTED: YES
GLOBULIN PLAS-MCNC: 2.9 G/DL (ref 2–3.5)
GLUCOSE BLD-MCNC: 90 MG/DL (ref 70–99)
HBA1C MFR BLD: 5.5 % (ref ?–5.7)
HCT VFR BLD AUTO: 39.4 %
HDLC SERPL-MCNC: 56 MG/DL (ref 40–59)
HGB BLD-MCNC: 13.5 G/DL
IMM GRANULOCYTES # BLD AUTO: 0.01 X10(3) UL (ref 0–1)
IMM GRANULOCYTES NFR BLD: 0.2 %
IRON SATN MFR SERPL: 33 %
IRON SERPL-MCNC: 122 UG/DL
LDLC SERPL CALC-MCNC: 122 MG/DL (ref ?–100)
LYMPHOCYTES # BLD AUTO: 1.77 X10(3) UL (ref 1–4)
LYMPHOCYTES NFR BLD AUTO: 31.3 %
MCH RBC QN AUTO: 30.1 PG (ref 26–34)
MCHC RBC AUTO-ENTMCNC: 34.3 G/DL (ref 31–37)
MCV RBC AUTO: 87.9 FL
MONOCYTES # BLD AUTO: 0.34 X10(3) UL (ref 0.1–1)
MONOCYTES NFR BLD AUTO: 6 %
NEUTROPHILS # BLD AUTO: 3.27 X10 (3) UL (ref 1.5–7.7)
NEUTROPHILS # BLD AUTO: 3.27 X10(3) UL (ref 1.5–7.7)
NEUTROPHILS NFR BLD AUTO: 57.9 %
NONHDLC SERPL-MCNC: 141 MG/DL (ref ?–130)
OSMOLALITY SERPL CALC.SUM OF ELEC: 295 MOSM/KG (ref 275–295)
PLATELET # BLD AUTO: 235 10(3)UL (ref 150–450)
POTASSIUM SERPL-SCNC: 4.1 MMOL/L (ref 3.5–5.1)
PROT SERPL-MCNC: 7.1 G/DL (ref 5.7–8.2)
RBC # BLD AUTO: 4.48 X10(6)UL
SODIUM SERPL-SCNC: 143 MMOL/L (ref 136–145)
TIBC SERPL-MCNC: 373 UG/DL (ref 250–425)
TRANSFERRIN SERPL-MCNC: 250 MG/DL (ref 250–380)
TRIGL SERPL-MCNC: 106 MG/DL (ref 30–149)
TSI SER-ACNC: 2.16 MIU/ML (ref 0.55–4.78)
VLDLC SERPL CALC-MCNC: 19 MG/DL (ref 0–30)
WBC # BLD AUTO: 5.7 X10(3) UL (ref 4–11)

## 2024-07-31 PROCEDURE — 80053 COMPREHEN METABOLIC PANEL: CPT

## 2024-07-31 PROCEDURE — 86003 ALLG SPEC IGE CRUDE XTRC EA: CPT

## 2024-07-31 PROCEDURE — 85025 COMPLETE CBC W/AUTO DIFF WBC: CPT

## 2024-07-31 PROCEDURE — 36415 COLL VENOUS BLD VENIPUNCTURE: CPT

## 2024-07-31 PROCEDURE — 83036 HEMOGLOBIN GLYCOSYLATED A1C: CPT

## 2024-07-31 PROCEDURE — 82785 ASSAY OF IGE: CPT

## 2024-07-31 PROCEDURE — 80061 LIPID PANEL: CPT

## 2024-07-31 PROCEDURE — 84466 ASSAY OF TRANSFERRIN: CPT

## 2024-07-31 PROCEDURE — 83540 ASSAY OF IRON: CPT

## 2024-07-31 PROCEDURE — 82728 ASSAY OF FERRITIN: CPT

## 2024-07-31 PROCEDURE — 84443 ASSAY THYROID STIM HORMONE: CPT

## 2024-08-01 LAB
ALLERGEN BRAZIL NUT: <0.1 KUA/L (ref ?–0.1)
ALMOND IGE QN: 0.1 KUA/L (ref ?–0.1)
CASHEW NUT IGE QN: <0.1 KUA/L (ref ?–0.1)
CLAM IGE QN: <0.1 KUA/L (ref ?–0.1)
CODFISH IGE QN: <0.1 KUA/L (ref ?–0.1)
CORN IGE QN: <0.1 KUA/L (ref ?–0.1)
COW MILK IGE QN: <0.1 KUA/L (ref ?–0.1)
EGG WHITE IGE QN: <0.1 KUA/L (ref ?–0.1)
HAZELNUT IGE QN: <0.1 KUA/L (ref ?–0.1)
IGE SERPL-ACNC: 258 KU/L (ref 2–214)
PEANUT IGE QN: <0.1 KUA/L (ref ?–0.1)
SALMON IGE QN: <0.1 KUA/L (ref ?–0.1)
SCALLOP IGE QN: <0.1 KUA/L (ref ?–0.1)
SESAME SEED IGE QN: <0.1 KUA/L (ref ?–0.1)
SHRIMP IGE QN: <0.1 KUA/L (ref ?–0.1)
SOYBEAN IGE QN: <0.1 KUA/L (ref ?–0.1)
WALNUT IGE QN: <0.1 KUA/L (ref ?–0.1)
WHEAT IGE QN: <0.1 KUA/L (ref ?–0.1)

## 2024-08-07 ENCOUNTER — OFFICE VISIT (OUTPATIENT)
Dept: INTERNAL MEDICINE CLINIC | Facility: CLINIC | Age: 39
End: 2024-08-07

## 2024-08-07 VITALS
SYSTOLIC BLOOD PRESSURE: 115 MMHG | BODY MASS INDEX: 31.53 KG/M2 | HEART RATE: 72 BPM | DIASTOLIC BLOOD PRESSURE: 79 MMHG | WEIGHT: 167 LBS | HEIGHT: 61 IN | OXYGEN SATURATION: 95 %

## 2024-08-07 DIAGNOSIS — R21 RASH AND NONSPECIFIC SKIN ERUPTION: Primary | ICD-10-CM

## 2024-08-07 PROCEDURE — 99213 OFFICE O/P EST LOW 20 MIN: CPT

## 2024-08-07 RX ORDER — TRIAMCINOLONE ACETONIDE 1 MG/G
CREAM TOPICAL 2 TIMES DAILY PRN
Qty: 45 G | Refills: 0 | Status: SHIPPED | OUTPATIENT
Start: 2024-08-07

## 2024-08-07 NOTE — PROGRESS NOTES
Patient notified. Wants to know when should check thyroid level again? Also said she is on Citalopram and Lorazepam and said she thinks these can make you tired too  Wants to know if the doses should be adjusted on these medications?      Fwd to Dr. Vitor Dubose: Please advise Subjective:   Marissa Joshi is a 38 year old female who presents for Rash (Rash on right breast)     Starting Sunday morning, started with itching on the right breast and went to look in the mirror and saw a few red spots around the areola  Put some cortisone cream on which did not help   Monday afternoon saw two more red spots   No breast pain, no bleeding or scaling, no nipple drainage or lesions of the nipple  She is scheduled for a screening mammogram next week     History/Other:    Chief Complaint Reviewed and Verified  Nursing Notes Reviewed and   Verified  Tobacco Reviewed  Allergies Reviewed  Medications Reviewed    OB Status Reviewed         Tobacco:  She has never smoked tobacco.    Current Outpatient Medications   Medication Sig Dispense Refill    triamcinolone 0.1 % External Cream Apply topically 2 (two) times daily as needed. 45 g 0    citalopram 10 MG Oral Tab Take 1 tablet (10 mg total) by mouth daily.      Ferrous Sulfate 325 (65 Fe) MG Oral Tab Take 1 tablet (325 mg total) by mouth daily with breakfast. (Patient not taking: Reported on 8/7/2024) 30 tablet 0     Review of Systems:  Review of Systems  10 point review of systems otherwise negative with the exception of HPI and assessment and plan    Objective:   /79 (BP Location: Left arm, Patient Position: Sitting, Cuff Size: adult)   Pulse 72   Ht 5' 1\" (1.549 m)   Wt 167 lb (75.8 kg)   LMP 07/26/2024 (Exact Date)   SpO2 95%   BMI 31.55 kg/m²  Estimated body mass index is 31.55 kg/m² as calculated from the following:    Height as of this encounter: 5' 1\" (1.549 m).    Weight as of this encounter: 167 lb (75.8 kg).  Physical Exam  Vitals reviewed.   Constitutional:       General: She is not in acute distress.     Appearance: Normal appearance. She is well-developed.   Cardiovascular:      Rate and Rhythm: Normal rate.   Pulmonary:      Effort: Pulmonary effort is normal.   Chest:      Comments: Small scattered red macules around  outer areola  No crusting or scaling, no nipple involvement   Skin:     General: Skin is warm and dry.   Neurological:      Mental Status: She is alert and oriented to person, place, and time.         Assessment & Plan:   1. Rash and nonspecific skin eruption (Primary)  -     Triamcinolone Acetonide; Apply topically 2 (two) times daily as needed.  Dispense: 45 g; Refill: 0    LION Kenney, 8/7/2024, 5:54 PM

## 2024-08-15 ENCOUNTER — HOSPITAL ENCOUNTER (OUTPATIENT)
Dept: MAMMOGRAPHY | Age: 39
Discharge: HOME | End: 2024-08-15
Payer: COMMERCIAL

## 2024-08-15 DIAGNOSIS — Z12.31 ENCOUNTER FOR SCREENING MAMMOGRAM FOR MALIGNANT NEOPLASM OF BREAST: ICD-10-CM

## 2024-08-15 PROCEDURE — 77063 BREAST TOMOSYNTHESIS BI: CPT

## 2024-08-15 PROCEDURE — 77067 SCR MAMMO BI INCL CAD: CPT

## 2024-09-11 ENCOUNTER — OFFICE VISIT (OUTPATIENT)
Dept: INTERNAL MEDICINE CLINIC | Facility: CLINIC | Age: 39
End: 2024-09-11

## 2024-09-11 VITALS
HEART RATE: 58 BPM | WEIGHT: 170 LBS | HEIGHT: 61 IN | SYSTOLIC BLOOD PRESSURE: 112 MMHG | OXYGEN SATURATION: 98 % | BODY MASS INDEX: 32.1 KG/M2 | DIASTOLIC BLOOD PRESSURE: 65 MMHG

## 2024-09-11 DIAGNOSIS — M54.2 NECK PAIN: Primary | ICD-10-CM

## 2024-09-11 PROCEDURE — 99214 OFFICE O/P EST MOD 30 MIN: CPT

## 2024-09-11 RX ORDER — NAPROXEN 500 MG/1
500 TABLET ORAL 2 TIMES DAILY WITH MEALS
Qty: 28 TABLET | Refills: 0 | Status: SHIPPED | OUTPATIENT
Start: 2024-09-11

## 2024-09-11 NOTE — PROGRESS NOTES
Subjective:   Marissa Joshi is a 38 year old female who presents for Headache and Neck Pain     C/c about a month of neck pain, denies any inciting trauma or injury. The pain came on gradually. She is wondering if it is due to her pillow being flat. Pain is on both sides of the posterior neck and worsened by turning her head to the right. The past two weeks the pain has been worsening and causing headaches. Recently bought a new pillow which is helping her sleep. Has taken some ibuprofen which helps with the headache. Massaging helps the pain. 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   No radiation of pain down the shoulders or arms. Sometimes has tingling in her lower neck to shoulders. Denies fevers or chills.   Headache is like a band around her head on both sides, it is mild but occurring daily.     History/Other:    Chief Complaint Reviewed and Verified  No Further Nursing Notes to   Review  Tobacco Reviewed  Allergies Reviewed  Medications Reviewed  OB   Status Reviewed         Tobacco:  She has never smoked tobacco.    Current Outpatient Medications   Medication Sig Dispense Refill    naproxen 500 MG Oral Tab Take 1 tablet (500 mg total) by mouth 2 (two) times daily with meals. 28 tablet 0    citalopram 10 MG Oral Tab Take 1 tablet (10 mg total) by mouth daily.      triamcinolone 0.1 % External Cream Apply topically 2 (two) times daily as needed. (Patient not taking: Reported on 9/11/2024) 45 g 0    Ferrous Sulfate 325 (65 Fe) MG Oral Tab Take 1 tablet (325 mg total) by mouth daily with breakfast. (Patient not taking: Reported on 8/7/2024) 30 tablet 0     Review of Systems:  Review of Systems  10 point review of systems otherwise negative with the exception of HPI and assessment and plan    Objective:   /65   Pulse 58   Ht 5' 1\" (1.549 m)   Wt 170 lb (77.1 kg)   LMP 08/27/2024 (Exact Date)   SpO2 98%   BMI 32.12 kg/m²  Estimated body mass index is 32.12 kg/m² as calculated  from the following:    Height as of this encounter: 5' 1\" (1.549 m).    Weight as of this encounter: 170 lb (77.1 kg).  Physical Exam  Vitals reviewed.   Constitutional:       General: She is not in acute distress.     Appearance: Normal appearance. She is well-developed.   Cardiovascular:      Rate and Rhythm: Normal rate and regular rhythm.      Heart sounds: Normal heart sounds.   Pulmonary:      Effort: Pulmonary effort is normal.      Breath sounds: Normal breath sounds.   Musculoskeletal:      Cervical back: Normal range of motion. Muscular tenderness present. No spinous process tenderness.   Skin:     General: Skin is warm and dry.   Neurological:      Mental Status: She is alert and oriented to person, place, and time.         Assessment & Plan:   1. Neck pain (Primary)  -     Naproxen; Take 1 tablet (500 mg total) by mouth 2 (two) times daily with meals.  Dispense: 28 tablet; Refill: 0  Offered NSAIDs vs muscle relaxer and she chooses NSAID  Will provide some home exercises and if not improving can do formal physical therapy    LION Kenney, 9/11/2024, 5:51 PM

## 2024-10-16 ENCOUNTER — MED REC SCAN ONLY (OUTPATIENT)
Dept: INTERNAL MEDICINE CLINIC | Facility: CLINIC | Age: 39
End: 2024-10-16

## 2024-10-16 ENCOUNTER — OFFICE VISIT (OUTPATIENT)
Dept: INTERNAL MEDICINE CLINIC | Facility: CLINIC | Age: 39
End: 2024-10-16

## 2024-10-16 VITALS
DIASTOLIC BLOOD PRESSURE: 68 MMHG | HEART RATE: 61 BPM | SYSTOLIC BLOOD PRESSURE: 110 MMHG | WEIGHT: 169 LBS | BODY MASS INDEX: 31.91 KG/M2 | HEIGHT: 61 IN | OXYGEN SATURATION: 98 %

## 2024-10-16 DIAGNOSIS — M54.2 NECK PAIN: Primary | ICD-10-CM

## 2024-10-16 DIAGNOSIS — Z23 NEED FOR VACCINATION: ICD-10-CM

## 2024-10-16 PROCEDURE — 90656 IIV3 VACC NO PRSV 0.5 ML IM: CPT

## 2024-10-16 PROCEDURE — 90471 IMMUNIZATION ADMIN: CPT

## 2024-10-16 PROCEDURE — 99214 OFFICE O/P EST MOD 30 MIN: CPT

## 2024-10-16 RX ORDER — METHOCARBAMOL 750 MG/1
750 TABLET, FILM COATED ORAL NIGHTLY PRN
Qty: 15 TABLET | Refills: 0 | Status: SHIPPED | OUTPATIENT
Start: 2024-10-16

## 2024-10-16 NOTE — PROGRESS NOTES
Subjective:   Marissa Joshi is a 38 year old female who presents for Neck Pain     Presents to f/u on neck pain   It is almost the same as previous - both sides of her neck up to the scalp  No radiation down to the shoulders or arms, no numbness or tingling   Did not take the naproxen but was doing exercises and  has been massaging which helps for a little while but then it comes back   Also went for a massage last Saturday which helped     History/Other:    Chief Complaint Reviewed and Verified  No Further Nursing Notes to   Review  Tobacco Reviewed  Allergies Reviewed  Medications Reviewed    Problem List Reviewed  Medical History Reviewed  Surgical History   Reviewed  Family History Reviewed         Tobacco:  She has never smoked tobacco.    Current Outpatient Medications   Medication Sig Dispense Refill    methocarbamol 750 MG Oral Tab Take 1 tablet (750 mg total) by mouth nightly as needed. 15 tablet 0    citalopram 10 MG Oral Tab Take 1 tablet (10 mg total) by mouth daily.      naproxen 500 MG Oral Tab Take 1 tablet (500 mg total) by mouth 2 (two) times daily with meals. (Patient not taking: Reported on 10/16/2024) 28 tablet 0    triamcinolone 0.1 % External Cream Apply topically 2 (two) times daily as needed. (Patient not taking: Reported on 10/16/2024) 45 g 0    Ferrous Sulfate 325 (65 Fe) MG Oral Tab Take 1 tablet (325 mg total) by mouth daily with breakfast. (Patient not taking: Reported on 10/16/2024) 30 tablet 0     Review of Systems:  Review of Systems  10 point review of systems otherwise negative with the exception of HPI and assessment and plan    Objective:   /68   Pulse 61   Ht 5' 1\" (1.549 m)   Wt 169 lb (76.7 kg)   LMP 08/27/2024 (Exact Date)   SpO2 98%   BMI 31.93 kg/m²  Estimated body mass index is 31.93 kg/m² as calculated from the following:    Height as of this encounter: 5' 1\" (1.549 m).    Weight as of this encounter: 169 lb (76.7 kg).  Physical Exam  Vitals  reviewed.   Constitutional:       General: She is not in acute distress.     Appearance: Normal appearance. She is well-developed.   Cardiovascular:      Rate and Rhythm: Normal rate and regular rhythm.      Heart sounds: Normal heart sounds.   Pulmonary:      Effort: Pulmonary effort is normal.      Breath sounds: Normal breath sounds.   Musculoskeletal:      Cervical back: Tenderness (musculature right and left neck) present. No rigidity or bony tenderness.   Skin:     General: Skin is warm and dry.   Neurological:      Mental Status: She is alert and oriented to person, place, and time.         Assessment & Plan:   1. Neck pain (Primary)  -     Physical Therapy Referral - [facility]  -     Methocarbamol; Take 1 tablet (750 mg total) by mouth nightly as needed.  Dispense: 15 tablet; Refill: 0  2. Need for vaccination  -     Fluzone trivalent vaccine, PF 0.5mL, 6mo+ (29256)    LION Kenney, 10/16/2024, 6:44 PM

## 2024-11-15 ENCOUNTER — TELEPHONE (OUTPATIENT)
Dept: PHYSICAL THERAPY | Facility: HOSPITAL | Age: 39
End: 2024-11-15

## 2024-11-18 ENCOUNTER — OFFICE VISIT (OUTPATIENT)
Dept: PHYSICAL THERAPY | Age: 39
End: 2024-11-18
Payer: COMMERCIAL

## 2024-11-18 DIAGNOSIS — M54.2 NECK PAIN: Primary | ICD-10-CM

## 2024-11-18 PROCEDURE — 97110 THERAPEUTIC EXERCISES: CPT | Performed by: PHYSICAL THERAPIST

## 2024-11-18 PROCEDURE — 97161 PT EVAL LOW COMPLEX 20 MIN: CPT | Performed by: PHYSICAL THERAPIST

## 2024-11-18 PROCEDURE — 97112 NEUROMUSCULAR REEDUCATION: CPT | Performed by: PHYSICAL THERAPIST

## 2024-11-18 NOTE — PROGRESS NOTES
SPINE EVALUATION:   Referring Physician: Dr. Sanchez  Diagnosis: Neck pain (M54.2)      Date of Service: 11/18/2024  MEDICATION CHANGES SINCE LAST MD REVIEW? NO  Next MD f/u: none scheduled     PATIENT SUMMARY   Marissa Joshi is a 39 year old female who presents to therapy today with complaints of neck pain (R) worse than (L) since end of July 2024 and noticed while she was riding a car.  Pt describes pain level current 3/10, at best 3/10, at worst 3/10.   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     Marissa describes prior level of function being (I) and active; lives with family and shares homemaking chores; cares for 20lb puppy; describes taking out garbage as heaviest task along with yard work and snow removal.   Patient works FT as a .  Pt goals include pain relief.  Past medical history was reviewed with Marissa. Significant findings include  has no past medical history on file.   Pt denies diplopia, dysarthria, dysphasia, dizziness, drop attacks, bowel/bladder changes, saddle anesthesia, and JUDI LE N/T.    ASSESSMENT  Marissa presents to physical therapy evaluation with primary c/o (B)neck pain (R) worse than (L) along with intermittent occipital pain. The results of the objective tests and measures show symptoms and findings consistent with postures in sustained protrusion resulting in tightness and increased tissue irritability (B) scalenes and occipital pain from tissue approximation in this area reducing posterior circulation.  Functional deficits include but are not limited to difficulty with long hours of seated computer work as a .  Signs and symptoms are consistent with medical diagnoses as stated above. Pt and PT discussed evaluation findings, pathology, POC and HEP.  Pt voiced understanding and performs HEP correctly without reported  pain. Skilled Physical Therapy is medically necessary to address the above impairments and reach functional goals.     Precautions:  None  OBJECTIVE:   Observation/Posture: forward head and rounded shoulders  Neuro Screen: negative    Cervical AROM: (* denotes performed with pain)  Flexion: WNL  Extension: WNL  Sidebending: R WNL*; L WNL*  Rotation: R WNL*; L WNL*    Accessory motion: mod loss to mid to lower cervical SG and rotation  Palpation: tenderness to (B) scalenes (R) worse    Strength: (* denotes performed with pain)  UE/Scapular   (B)UE grossly 4+ to 5/5 except    Rhomboids: R 3/5, L 3/5  Mid trap: R 3/5; L 3/5  Lats: R 3/5, L 3/5  Low trap: R 3/5; L 3/5     Strength: R 49 lbs; L 58 lbs     Flexibility:   UE/Scapular   Upper Trap: R min loss; L min loss  Levator Scap: R min loss; L min loss  Pec Major: R mod loss; L mod loss  Scalenes: R mod loss, L mod loss     Special tests:   (-) cervical compression/distraction    Gait: pt ambulates on level ground with normal mechanics.  Balance: SLS R WNL, L WNL    Today’s Treatment and Response:   Pt education was provided on exam findings, treatment diagnosis, treatment plan, expectations, and prognosis.   Pt was also provided recommendations for activity modifications, possible soreness after evaluation, modalities as needed [ice/heat], postural corrections, ergonomics, pain science education , detrimental fear avoidance behaviors, and importance of remaining active    Neuromuscular re-education (8 mins)  Posture education and effects on tissue irritability  Wall posture drill  Work ergonomics education.    Therapeutic exercise (15 mins)  Cervical retractions  Scapular retractions  Scalene self-stretching  Doorway pec stretch A and 90/90  Patient was instructed in and issued a HEP for: Refer to patient instructions.    Charges: PT Eval Low Complexity, 20 mins; Therapeutic exercise x 1; Neuromuscular re-education x 1     Total Timed Treatment: 23 mins    Total  Treatment Time: 43 mins     Based on clinical rationale and outcome measures, this evaluation involved Low Complexity decision making due to 1-2 personal factors/comorbidities, 4+ body structures involved/activity limitations, and unstable symptoms including changing pain levels.  PLAN OF CARE:    Goals: (to be met in 8 visits)   Marissa will demonstrate consistent postural awareness to allow 0 rest pain and reduce tissue irritability.  Camela will have improved cervical AROM to WNL and painfree to allow painfree work.  Rimala will have improved scapular strength to 4/5 to allow painfree lifting and carrying.  Marissa will be (I) with a home program to allow discharge from PT towards further self-recovery and maintenance of function.    Frequency / Duration: Patient will be seen for 1-2 x/week or a total of 8 visits over a 90 day period. Treatment will include: Manual Therapy, Neuromuscular Re-education, Self-Care Home Management, Therapeutic Activities, Therapeutic Exercise, Home Exercise Program instruction, and Modalities to include: Ultrasound    Education or treatment limitation: None  Rehab Potential:good    Neck Disability Index Score  Score: (Patient-Rptd) 16 % (11/18/2024  9:11 AM)      Marissa was advised of these findings, precautions, and treatment options and has agreed to actively participate in planning and for this course of care.    Thank you for your referral. Please co-sign or sign and return this letter via fax as soon as possible to [phone]. If you have any questions, please contact me at Dept: [phone]    Sincerely,  Electronically signed by therapist: Lux Byrd, PT    Physician's certification required: Yes  I certify the need for these services furnished under this plan of treatment and while under my care.    X___________________________________________________ Date____________________    Certification From: 11/18/2024  To:2/16/2025

## 2024-11-20 ENCOUNTER — OFFICE VISIT (OUTPATIENT)
Dept: PHYSICAL THERAPY | Age: 39
End: 2024-11-20
Payer: COMMERCIAL

## 2024-11-20 PROCEDURE — 97110 THERAPEUTIC EXERCISES: CPT | Performed by: PHYSICAL THERAPIST

## 2024-11-20 PROCEDURE — 97140 MANUAL THERAPY 1/> REGIONS: CPT | Performed by: PHYSICAL THERAPIST

## 2024-11-20 NOTE — PROGRESS NOTES
SPINE EVALUATION:   Referring Physician: Dr. Sanchez  Diagnosis: Neck pain (M54.2)      Date of Service: 11/18/2024  MEDICATION CHANGES SINCE LAST MD REVIEW? NO  Next MD f/u: none scheduled     PATIENT SUMMARY   Marissa Joshi is a 39 year old female who presents to therapy today with complaints of neck pain (R) worse than (L) since end of July 2024 and noticed while she was riding a car.  Pt describes pain level current 3/10, at best 3/10, at worst 3/10.   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     Marissa describes prior level of function being (I) and active; lives with family and shares homemaking chores; cares for 20lb puppy; describes taking out garbage as heaviest task along with yard work and snow removal.   Patient works FT as a .  Pt goals include pain relief.  Past medical history was reviewed with Marissa. Significant findings include  has no past medical history on file.   Pt denies diplopia, dysarthria, dysphasia, dizziness, drop attacks, bowel/bladder changes, saddle anesthesia, and JUDI LE N/T.    ASSESSMENT  Marissa presents to physical therapy evaluation with primary c/o (B)neck pain (R) worse than (L) along with intermittent occipital pain. The results of the objective tests and measures show symptoms and findings consistent with postures in sustained protrusion resulting in tightness and increased tissue irritability (B) scalenes and occipital pain from tissue approximation in this area reducing posterior circulation.  Functional deficits include but are not limited to difficulty with long hours of seated computer work as a .  Signs and symptoms are consistent with medical diagnoses as stated above. Pt and PT discussed evaluation findings, pathology, POC and HEP.  Pt voiced understanding and performs HEP correctly without reported  pain. Skilled Physical Therapy is medically necessary to address the above impairments and reach functional goals.     Precautions:  None  OBJECTIVE:   Observation/Posture: forward head and rounded shoulders  Neuro Screen: negative    Cervical AROM: (* denotes performed with pain)  Flexion: WNL  Extension: WNL  Sidebending: R WNL*; L WNL*  Rotation: R WNL*; L WNL*    Accessory motion: mod loss to mid to lower cervical SG and rotation  Palpation: tenderness to (B) scalenes (R) worse    Strength: (* denotes performed with pain)  UE/Scapular   (B)UE grossly 4+ to 5/5 except    Rhomboids: R 3/5, L 3/5  Mid trap: R 3/5; L 3/5  Lats: R 3/5, L 3/5  Low trap: R 3/5; L 3/5     Strength: R 49 lbs; L 58 lbs     Flexibility:   UE/Scapular   Upper Trap: R min loss; L min loss  Levator Scap: R min loss; L min loss  Pec Major: R mod loss; L mod loss  Scalenes: R mod loss, L mod loss     Special tests:   (-) cervical compression/distraction    Gait: pt ambulates on level ground with normal mechanics.  Balance: SLS R WNL, L WNL    Today’s Treatment and Response:   Pt education was provided on exam findings, treatment diagnosis, treatment plan, expectations, and prognosis.   Pt was also provided recommendations for activity modifications, possible soreness after evaluation, modalities as needed [ice/heat], postural corrections, ergonomics, pain science education , detrimental fear avoidance behaviors, and importance of remaining active    Neuromuscular re-education (8 mins)  Posture education and effects on tissue irritability  Wall posture drill  Work ergonomics education.    Therapeutic exercise (15 mins)  Cervical retractions  Scapular retractions  Scalene self-stretching  Doorway pec stretch A and 90/90  Patient was instructed in and issued a HEP for: Refer to patient instructions.    Charges: PT Eval Low Complexity, 20 mins; Therapeutic exercise x 1; Neuromuscular re-education x 1     Total Timed Treatment: 23 mins    Total  Treatment Time: 43 mins     Based on clinical rationale and outcome measures, this evaluation involved Low Complexity decision making due to 1-2 personal factors/comorbidities, 4+ body structures involved/activity limitations, and unstable symptoms including changing pain levels.  PLAN OF CARE:    Goals: (to be met in 8 visits)   Marissa will demonstrate consistent postural awareness to allow 0 rest pain and reduce tissue irritability.  Camela will have improved cervical AROM to WNL and painfree to allow painfree work.  Camela will have improved scapular strength to 4/5 to allow painfree lifting and carrying.  Marissa will be (I) with a home program to allow discharge from PT towards further self-recovery and maintenance of function.    Frequency / Duration: Patient will be seen for 1-2 x/week or a total of 8 visits over a 90 day period. Treatment will include: Manual Therapy, Neuromuscular Re-education, Self-Care Home Management, Therapeutic Activities, Therapeutic Exercise, Home Exercise Program instruction, and Modalities to include: Ultrasound    Education or treatment limitation: None  Rehab Potential:good    Neck Disability Index Score  Score: (Patient-Rptd) 16 % (11/18/2024  9:11 AM)      Marissa was advised of these findings, precautions, and treatment options and has agreed to actively participate in planning and for this course of care.    Thank you for your referral. Please co-sign or sign and return this letter via fax as soon as possible to [phone]. If you have any questions, please contact me at Dept: [phone]    Sincerely,  Electronically signed by therapist: Lux Byrd, PT    Physician's certification required: Yes  I certify the need for these services furnished under this plan of treatment and while under my care.    X___________________________________________________ Date____________________    Certification From: 11/18/2024  To:2/16/2025         Dx:   Diagnosis: Neck pain (M54.2)                  Insurance (Authorized # of Visits):  8           Authorizing Physician: Dr. Laura Lester MD visit: none scheduled  Fall Risk: standard         Precautions: n/a           Medication changes per patient? NO  Date of evaluation/PN:11/18/2024  Pain rating: 3/10 upper and midback pain  Subjective: Reports doing her exercises and has been having some upper and midback pain.    Objective: Releases noted with thoracic and CT junction mobilization allowing symptom abolishment and increased AROM; added 1st rib self-mobilization/scalene stretching to HEP.      Assessment: Loss of thoracic mobility resulting in increased muscular strain with attempts at postural correction.  Treatment today include Manual Therapy and Therapeutic Exercise focusing on postural correction and reducing joint and soft tissue restrictions and noted improvement in abolishment of symptoms post session. Residual deficits continue to be noted in decreased joint and soft tissue mobility and will be addressed with continued skilled interventions.      Goals: In progress as follows:  Camela will demonstrate consistent postural awareness to allow 0 rest pain and reduce tissue irritability.  Camela will have improved cervical AROM to WNL and painfree to allow painfree work.  Camela will have improved scapular strength to 4/5 to allow painfree lifting and carrying.  Camela will be (I) with a home program to allow discharge from PT towards further self-recovery and maintenance of function.    Plan: Check response to updated HEP.   Date: 11/20/2024  TX#: 2/8 Date:                 TX#: 3/ Date:                 TX#: 4/ Date:                 TX#: 5/ Date:   Tx#: 6/ Date:   Tx#: 7/ Date:   Tx#: 8/ Date:   Tx#: 9/ Date:   Tx#: 10/ Date:   Tx#: 11/ Date:   Tx#: 12/ Date:   Tx#: 13/ Date:   Tx#: 14/ Date:   Tx#: 15/ Date:   Tx#: 16/   THERAPEUTIC EX 23 mins                 Scifit UE only  L1 x 3 mins ea fwd/retro focus on upright posture                 Doorway  stretch A and 90/90 3 x 30 secs ea                 Scalene stretching 5 mins                 1st rib self mobilization with belt 10 ea side                 Cervical SB stretch 3 mins                 Cervical rotation stretch 3 mins                                                     NEUROMUSCULAR RE-EDUCATION                                    MANUAL TX 23 mins                 STM Paracervicals/scalenes/UT                 Joint mobilization Thoracic PA's/CT junction distraction Gr 3-4                                    THERAPEUTIC ACTIVITY                                    HEP: Refer to patient instructions    Charges: Therapeutic exercise x 2; Manual Therapy x 2       Total Timed Treatment: 46 min  Total Treatment Time: 46 min

## 2024-11-25 ENCOUNTER — APPOINTMENT (OUTPATIENT)
Dept: PHYSICAL THERAPY | Age: 39
End: 2024-11-25
Payer: COMMERCIAL

## 2024-11-27 ENCOUNTER — OFFICE VISIT (OUTPATIENT)
Dept: PHYSICAL THERAPY | Age: 39
End: 2024-11-27
Payer: COMMERCIAL

## 2024-11-27 PROCEDURE — 97112 NEUROMUSCULAR REEDUCATION: CPT | Performed by: PHYSICAL THERAPIST

## 2024-11-27 PROCEDURE — 97110 THERAPEUTIC EXERCISES: CPT | Performed by: PHYSICAL THERAPIST

## 2024-11-27 PROCEDURE — 97140 MANUAL THERAPY 1/> REGIONS: CPT | Performed by: PHYSICAL THERAPIST

## 2024-11-27 NOTE — PROGRESS NOTES
SPINE EVALUATION:   Referring Physician: Dr. Sacnhez  Diagnosis: Neck pain (M54.2)      Date of Service: 11/18/2024  MEDICATION CHANGES SINCE LAST MD REVIEW? NO  Next MD f/u: none scheduled     PATIENT SUMMARY   Marissa Joshi is a 39 year old female who presents to therapy today with complaints of neck pain (R) worse than (L) since end of July 2024 and noticed while she was riding a car.  Pt describes pain level current 3/10, at best 3/10, at worst 3/10.   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     Marissa describes prior level of function being (I) and active; lives with family and shares homemaking chores; cares for 20lb puppy; describes taking out garbage as heaviest task along with yard work and snow removal.   Patient works FT as a .  Pt goals include pain relief.  Past medical history was reviewed with Marissa. Significant findings include  has no past medical history on file.   Pt denies diplopia, dysarthria, dysphasia, dizziness, drop attacks, bowel/bladder changes, saddle anesthesia, and JUDI LE N/T.    ASSESSMENT  Marissa presents to physical therapy evaluation with primary c/o (B)neck pain (R) worse than (L) along with intermittent occipital pain. The results of the objective tests and measures show symptoms and findings consistent with postures in sustained protrusion resulting in tightness and increased tissue irritability (B) scalenes and occipital pain from tissue approximation in this area reducing posterior circulation.  Functional deficits include but are not limited to difficulty with long hours of seated computer work as a .  Signs and symptoms are consistent with medical diagnoses as stated above. Pt and PT discussed evaluation findings, pathology, POC and HEP.  Pt voiced understanding and performs HEP correctly without reported  pain. Skilled Physical Therapy is medically necessary to address the above impairments and reach functional goals.     Precautions:  None  OBJECTIVE:   Observation/Posture: forward head and rounded shoulders  Neuro Screen: negative    Cervical AROM: (* denotes performed with pain)  Flexion: WNL  Extension: WNL  Sidebending: R WNL*; L WNL*  Rotation: R WNL*; L WNL*    Accessory motion: mod loss to mid to lower cervical SG and rotation  Palpation: tenderness to (B) scalenes (R) worse    Strength: (* denotes performed with pain)  UE/Scapular   (B)UE grossly 4+ to 5/5 except    Rhomboids: R 3/5, L 3/5  Mid trap: R 3/5; L 3/5  Lats: R 3/5, L 3/5  Low trap: R 3/5; L 3/5     Strength: R 49 lbs; L 58 lbs     Flexibility:   UE/Scapular   Upper Trap: R min loss; L min loss  Levator Scap: R min loss; L min loss  Pec Major: R mod loss; L mod loss  Scalenes: R mod loss, L mod loss     Special tests:   (-) cervical compression/distraction    Gait: pt ambulates on level ground with normal mechanics.  Balance: SLS R WNL, L WNL    Today’s Treatment and Response:   Pt education was provided on exam findings, treatment diagnosis, treatment plan, expectations, and prognosis.   Pt was also provided recommendations for activity modifications, possible soreness after evaluation, modalities as needed [ice/heat], postural corrections, ergonomics, pain science education , detrimental fear avoidance behaviors, and importance of remaining active    Neuromuscular re-education (8 mins)  Posture education and effects on tissue irritability  Wall posture drill  Work ergonomics education.    Therapeutic exercise (15 mins)  Cervical retractions  Scapular retractions  Scalene self-stretching  Doorway pec stretch A and 90/90  Patient was instructed in and issued a HEP for: Refer to patient instructions.    Charges: PT Eval Low Complexity, 20 mins; Therapeutic exercise x 1; Neuromuscular re-education x 1     Total Timed Treatment: 23 mins    Total  Treatment Time: 43 mins     Based on clinical rationale and outcome measures, this evaluation involved Low Complexity decision making due to 1-2 personal factors/comorbidities, 4+ body structures involved/activity limitations, and unstable symptoms including changing pain levels.  PLAN OF CARE:    Goals: (to be met in 8 visits)   Marissa will demonstrate consistent postural awareness to allow 0 rest pain and reduce tissue irritability.  Camela will have improved cervical AROM to WNL and painfree to allow painfree work.  Camela will have improved scapular strength to 4/5 to allow painfree lifting and carrying.  Marissa will be (I) with a home program to allow discharge from PT towards further self-recovery and maintenance of function.    Frequency / Duration: Patient will be seen for 1-2 x/week or a total of 8 visits over a 90 day period. Treatment will include: Manual Therapy, Neuromuscular Re-education, Self-Care Home Management, Therapeutic Activities, Therapeutic Exercise, Home Exercise Program instruction, and Modalities to include: Ultrasound    Education or treatment limitation: None  Rehab Potential:good    Neck Disability Index Score  Score: (Patient-Rptd) 16 % (11/18/2024  9:11 AM)      Marissa was advised of these findings, precautions, and treatment options and has agreed to actively participate in planning and for this course of care.    Thank you for your referral. Please co-sign or sign and return this letter via fax as soon as possible to [phone]. If you have any questions, please contact me at Dept: [phone]    Sincerely,  Electronically signed by therapist: Lux Byrd, PT    Physician's certification required: Yes  I certify the need for these services furnished under this plan of treatment and while under my care.    X___________________________________________________ Date____________________    Certification From: 11/18/2024  To:2/16/2025         Dx:   Diagnosis: Neck pain (M54.2)                  Insurance (Authorized # of Visits):  8           Authorizing Physician: Dr. Laura Lester MD visit: none scheduled  Fall Risk: standard         Precautions: n/a           Medication changes per patient? NO  Date of evaluation/PN:2024  Pain ratin  Subjective: Arrived painfree and only c/o ERP with stretching    Objective: Improved thoracic mobility noted but continues to have recurrence of tightness to lower cervical/CT junction and upper thoracic spine      Assessment: Improving postural control with improving thoracic mobility allowing for carry-over of symptom relief from lower cervical extension program.  Treatment today include Manual Therapy and Therapeutic Exercise focusing on postural correction and reducing joint and soft tissue restrictions and noted improvement in abolishment of symptoms post session. Residual deficits continue to be noted in decreased joint and soft tissue mobility and will be addressed with continued skilled interventions.      Goals: In progress as follows:  Camela will demonstrate consistent postural awareness to allow 0 rest pain and reduce tissue irritability.  Camela will have improved cervical AROM to WNL and painfree to allow painfree work.  Camela will have improved scapular strength to 4/5 to allow painfree lifting and carrying.  Camela will be (I) with a home program to allow discharge from PT towards further self-recovery and maintenance of function.    Plan: Check response to updated HEP.   Date: 2024  TX#:  Date: 2024           TX#: 3/8 Date:                 TX#: 4/ Date:                 TX#: 5/ Date:   Tx#: 6/ Date:   Tx#: 7/ Date:   Tx#: 8/ Date:   Tx#: 9/ Date:   Tx#: 10/ Date:   Tx#: 11/ Date:   Tx#: 12/ Date:   Tx#: 13/ Date:   Tx#: 14/ Date:   Tx#: 15/ Date:   Tx#: 16/   THERAPEUTIC EX 23 mins 23 mins                Scifit UE only  L1 x 3 mins ea fwd/retro focus on upright posture L2 x 4 mins ea fwd/retro focusing on upright posture                 Doorway stretch A and 90/90 3 x 30 secs ea                 Scalene stretching 5 mins                 1st rib self mobilization with belt 10 ea side                 Cervical SB stretch 3 mins                 Cervical rotation stretch 3 mins                 REIL  1x10                Prone upper back ext  2x10                Seated thoracic ext stretch  10 x 10 sec hold                Scapular retraction with ER  Red tband 2x10                                  NEUROMUSCULAR RE-EDUCATION  10 mins                 Standing with forehead on ball against wall V, W, horizontal abd  2 x 10 ea                                  MANUAL TX 23 mins 8 mins                STM Paracervicals/scalenes/UT Paracervicals/scalenes/UT                Joint mobilization Thoracic PA's/CT junction distraction Gr 3-4  Thoracic PA's/CT junction distraction Gr 3-4                                  THERAPEUTIC ACTIVITY                                    HEP: Refer to patient instructions    Charges: Therapeutic exercise x 2; Manual Therapy x 1; Neuromuscular re-education x 1       Total Timed Treatment: 41 min  Total Treatment Time: 41 min

## 2024-12-02 ENCOUNTER — OFFICE VISIT (OUTPATIENT)
Dept: PHYSICAL THERAPY | Age: 39
End: 2024-12-02
Payer: COMMERCIAL

## 2024-12-02 PROCEDURE — 97140 MANUAL THERAPY 1/> REGIONS: CPT | Performed by: PHYSICAL THERAPIST

## 2024-12-02 PROCEDURE — 97110 THERAPEUTIC EXERCISES: CPT | Performed by: PHYSICAL THERAPIST

## 2024-12-02 NOTE — PROGRESS NOTES
Dx:   Diagnosis: Neck pain (M54.2)                 Insurance (Authorized # of Visits):  8           Authorizing Physician: Dr. Laura Lester MD visit: none scheduled  Fall Risk: standard         Precautions: n/a           Medication changes per patient? NO  Date of evaluation/PN:2024  Pain ratin  Subjective: Only c/o tenderness to paracervical/scalenes during manual interventions which resolve at rest.    Objective: myofascial bands/nodules noted to paracervicals promoting forward head posture.      Assessment: Needs continued work on tight paracervical mm to allow improved carry-over of postural corrections.  Treatment today include Manual Therapy and Therapeutic Exercise focusing on postural correction and reducing joint and soft tissue restrictions and noted improvement in good carry-over of abolishment of symptoms post session. Residual deficits continue to be noted in decreased joint and soft tissue mobility and will be addressed with continued skilled interventions.      Goals: In progress as follows:  Camela will demonstrate consistent postural awareness to allow 0 rest pain and reduce tissue irritability.  Camela will have improved cervical AROM to WNL and painfree to allow painfree work.  Camela will have improved scapular strength to 4/5 to allow painfree lifting and carrying.  Camela will be (I) with a home program to allow discharge from PT towards further self-recovery and maintenance of function.    Plan: Check for latent increase in tissue irritability with manual interventions this session.   Date: 2024  TX#:  Date: 2024           TX#: 3/8 Date:2024            TX#:  Date:                 TX#: 5/ Date:   Tx#: 6/ Date:   Tx#: 7/ Date:   Tx#: / Date:   Tx#: / Date:   Tx#: 10/ Date:   Tx#: 11/ Date:   Tx#: 12/ Date:   Tx#: 13/ Date:   Tx#: 14/ Date:   Tx#: 15/ Date:   Tx#: 16/   THERAPEUTIC EX 23 mins 23 mins 23 mins               Scifit UE only  L1 x 3 mins ea fwd/retro  focus on upright posture L2 x 4 mins ea fwd/retro focusing on upright posture L3 x 4 mins ea fwd/retro focusing on upright posture               Doorway stretch A and 90/90 3 x 30 secs ea                 Scalene stretching 5 mins  5 mins               1st rib self mobilization with belt 10 ea side                 Cervical SB stretch 3 mins                 Cervical rotation stretch 3 mins                 REIL  1x10                Prone upper back ext  2x10                Seated thoracic ext stretch  10 x 10 sec hold 2 x 10 with 1lb dowel UE 90/90 against foam roll               Scapular retraction with ER  Red tband 2x10 Yellow tband loop with back against foam roll 3x10 x 5 sec holed               Face pulls   Red cord 2x10               Lat pull downs   Red cord 2x10                                 NEUROMUSCULAR RE-EDUCATION  10 mins                 Standing with forehead on ball against wall V, W, horizontal abd  2 x 10 ea                                  MANUAL TX 23 mins 8 mins 23 mins               STM Paracervicals/scalenes/UT Paracervicals/scalenes/UT Paracervicals/scalenes/UT               Joint mobilization Thoracic PA's/CT junction distraction Gr 3-4  Thoracic PA's/CT junction distraction Gr 3-4 Thoracic PA's/CT junction distraction Gr 3-4                                 THERAPEUTIC ACTIVITY                                    HEP: Continue current HEP.    Charges: Therapeutic exercise x 2; Manual Therapy x 2    Total Timed Treatment: 46 min  Total Treatment Time: 46 min

## 2024-12-04 ENCOUNTER — OFFICE VISIT (OUTPATIENT)
Dept: PHYSICAL THERAPY | Age: 39
End: 2024-12-04
Payer: COMMERCIAL

## 2024-12-04 PROCEDURE — 97112 NEUROMUSCULAR REEDUCATION: CPT | Performed by: PHYSICAL THERAPIST

## 2024-12-04 PROCEDURE — 97140 MANUAL THERAPY 1/> REGIONS: CPT | Performed by: PHYSICAL THERAPIST

## 2024-12-04 PROCEDURE — 97110 THERAPEUTIC EXERCISES: CPT | Performed by: PHYSICAL THERAPIST

## 2024-12-04 NOTE — PROGRESS NOTES
Dx:   Diagnosis: Neck pain (M54.2)                 Insurance (Authorized # of Visits):  8           Authorizing Physician: Dr. Laura Lester MD visit: none scheduled  Fall Risk: standard         Precautions: n/a           Medication changes per patient? NO  Date of evaluation/PN:2024  Pain ratin  Subjective: No pain but gets muscle soreness in her neck with exercises which resolve at rest.    Objective: minimal to moderate cues required to avoid UT substitution       Assessment: UT dominance still promoting rounded posture but able to improve with cues.  Treatment today include Neuromuscular re-education, Manual Therapy and Therapeutic Exercise focusing on postural correction and reducing joint and soft tissue restrictions and noted improvement in improved postural control post session. Residual deficits continue to be noted in decreased joint and soft tissue mobility and will be addressed with continued skilled interventions.      Goals: In progress as follows:  Camela will demonstrate consistent postural awareness to allow 0 rest pain and reduce tissue irritability.  Camela will have improved cervical AROM to WNL and painfree to allow painfree work.  Camela will have improved scapular strength to 4/5 to allow painfree lifting and carrying.  Camela will be (I) with a home program to allow discharge from PT towards further self-recovery and maintenance of function.    Plan: Check response to updated HEP.   Date: 2024  TX#:  Date: 2024           TX#: 3/8 Date:2024            TX#:  Date: 2024           TX#:   Date:   Tx#: 6/ Date:   Tx#: 7/ Date:   Tx#: 8/   THERAPEUTIC EX 23 mins 23 mins 23 mins 30 mins      Scifit UE only  L1 x 3 mins ea fwd/retro focus on upright posture L2 x 4 mins ea fwd/retro focusing on upright posture L3 x 4 mins ea fwd/retro focusing on upright posture L3 x 4 mins ea fwd/retro focusing on upright posture      Doorway stretch A and 90/90 3 x 30 secs ea          Scalene stretching 5 mins  5 mins       1st rib self mobilization with belt 10 ea side         Cervical SB stretch 3 mins         Cervical rotation stretch 3 mins         REIL  1x10        Prone upper back ext  2x10        Seated thoracic ext stretch  10 x 10 sec hold 2 x 10 with 1lb dowel UE 90/90 against foam roll       Scapular retraction with ER  Red tband 2x10 Yellow tband loop with back against foam roll 3x10 x 5 sec holed       Face pulls   Red cord 2x10 Red cord 3x10      Lat pull downs   Red cord 2x10 Red cord 3x10      High rows    Red cord 3x10      Low rows with ER    Red cord 3x10      Open book    SL 3x10 ea                NEUROMUSCULAR RE-EDUCATION  10 mins   10 mins      Standing with forehead on ball against wall V, W, horizontal abd  2 x 10 ea  3x10                MANUAL TX 23 mins 8 mins 23 mins 8 mins      STM Paracervicals/scalenes/UT Paracervicals/scalenes/UT Paracervicals/scalenes/UT       Joint mobilization Thoracic PA's/CT junction distraction Gr 3-4  Thoracic PA's/CT junction distraction Gr 3-4 Thoracic PA's/CT junction distraction Gr 3-4 Thoracic PA's/CT junction distraction Gr 3-4                THERAPEUTIC ACTIVITY                    HEP: Refer to patient instructions.    Charges: Therapeutic exercise x 2; Manual Therapy x 1; Neuromuscular re-education x 1    Total Timed Treatment: 48 min  Total Treatment Time: 48 min

## 2024-12-09 ENCOUNTER — OFFICE VISIT (OUTPATIENT)
Dept: PHYSICAL THERAPY | Age: 39
End: 2024-12-09
Payer: COMMERCIAL

## 2024-12-09 PROCEDURE — 97140 MANUAL THERAPY 1/> REGIONS: CPT | Performed by: PHYSICAL THERAPIST

## 2024-12-09 PROCEDURE — 97110 THERAPEUTIC EXERCISES: CPT | Performed by: PHYSICAL THERAPIST

## 2024-12-09 PROCEDURE — 97112 NEUROMUSCULAR REEDUCATION: CPT | Performed by: PHYSICAL THERAPIST

## 2024-12-09 NOTE — PROGRESS NOTES
Dx:   Diagnosis: Neck pain (M54.2)                 Insurance (Authorized # of Visits):  8           Authorizing Physician: Dr. Laura Lester MD visit: none scheduled  Fall Risk: standard         Precautions: n/a           Medication changes per patient? NO  Date of evaluation/PN:2024  Pain ratin  Subjective: Reports last episode of symptoms was last week.    Objective: demonstrating consistent postural awareness and good scapular clearing with OH movements.      Assessment: Good carry-over of symptom relief with consistent postural awarenes and improved upper quadrant mechanics.  Treatment today include Neuromuscular re-education, Manual Therapy and Therapeutic Exercise focusing on postural correction and reducing joint and soft tissue restrictions and noted improvement in improved postural control post session. Residual deficits continue to be noted in decreased joint and soft tissue mobility and will be addressed with continued skilled interventions.      Goals: In progress as follows:  Camela will demonstrate consistent postural awareness to allow 0 rest pain and reduce tissue irritability.  Camela will have improved cervical AROM to WNL and painfree to allow painfree work.  Camela will have improved scapular strength to 4/5 to allow painfree lifting and carrying.  Camela will be (I) with a home program to allow discharge from PT towards further self-recovery and maintenance of function.    Plan: Monitor for symptom recurrence and latent tissue irritation post-session.   Date: 2024  TX#:  Date: 2024           TX#: 3/8 Date:2024            TX#:  Date: 2024           TX#:   Date: 2024  Tx#:  Date:   Tx#: / Date:   Tx#: 8/   THERAPEUTIC EX 23 mins 23 mins 23 mins 30 mins 30 mins     Scifit UE only  L1 x 3 mins ea fwd/retro focus on upright posture L2 x 4 mins ea fwd/retro focusing on upright posture L3 x 4 mins ea fwd/retro focusing on upright posture L3 x 4 mins ea  fwd/retro focusing on upright posture L3 x 4 mins ea fwd/retro focusing on upright posture.     Doorway stretch A and 90/90 3 x 30 secs ea         Scalene stretching 5 mins  5 mins       1st rib self mobilization with belt 10 ea side         Cervical SB stretch 3 mins         Cervical rotation stretch 3 mins         REIL  1x10        Prone upper back ext  2x10        Seated thoracic ext stretch  10 x 10 sec hold 2 x 10 with 1lb dowel UE 90/90 against foam roll       Scapular retraction with ER  Red tband 2x10 Yellow tband loop with back against foam roll 3x10 x 5 sec holed       Face pulls   Red cord 2x10 Red cord 3x10      Lat pull downs   Red cord 2x10 Red cord 3x10 Multi pull 10lbs 3x10     High rows    Red cord 3x10 Multi pull 10lbs 3x10     Low rows with ER    Red cord 3x10 Multi pull 10lbs 3x10     Open book    SL 3x10 ea Red tband SL 3x10 ea     SB push up plus     RSB 3x10     SB wall walk with bird dogs     RSB 3x10     Prone alt/upp UE/LE lifts     3x10               NEUROMUSCULAR RE-EDUCATION  10 mins   10 mins 8 mins     Standing with forehead on ball against wall V, W, horizontal abd  2 x 10 ea  3x10      Standing (B) shoulder flexion/scaption and curl to press     1lb ea UE with back against foam roll 3x10 ea               MANUAL TX 23 mins 8 mins 23 mins 8 mins 8 mins     STM Paracervicals/scalenes/UT Paracervicals/scalenes/UT Paracervicals/scalenes/UT       Joint mobilization Thoracic PA's/CT junction distraction Gr 3-4  Thoracic PA's/CT junction distraction Gr 3-4 Thoracic PA's/CT junction distraction Gr 3-4 Thoracic PA's/CT junction distraction Gr 3-4 Thoracic PA's/CT junction distraction Gr 3-4               THERAPEUTIC ACTIVITY                    HEP: Continue current HEP.    Charges: Therapeutic exercise x 2; Manual Therapy x 1; Neuromuscular re-education x 1    Total Timed Treatment: 48 min  Total Treatment Time: 48 min

## 2024-12-11 ENCOUNTER — APPOINTMENT (OUTPATIENT)
Dept: PHYSICAL THERAPY | Age: 39
End: 2024-12-11
Payer: COMMERCIAL

## 2024-12-18 ENCOUNTER — OFFICE VISIT (OUTPATIENT)
Dept: PHYSICAL THERAPY | Age: 39
End: 2024-12-18
Payer: COMMERCIAL

## 2024-12-18 PROCEDURE — 97110 THERAPEUTIC EXERCISES: CPT | Performed by: PHYSICAL THERAPIST

## 2024-12-18 PROCEDURE — 97530 THERAPEUTIC ACTIVITIES: CPT | Performed by: PHYSICAL THERAPIST

## 2024-12-18 PROCEDURE — 97112 NEUROMUSCULAR REEDUCATION: CPT | Performed by: PHYSICAL THERAPIST

## 2024-12-18 NOTE — PROGRESS NOTES
Dx:   Diagnosis: Neck pain (M54.2)                 Insurance (Authorized # of Visits):  8           Authorizing Physician: Dr. Laura Lester MD visit: none scheduled  Fall Risk: standard         Precautions: n/a           Medication changes per patient? NO  Date of evaluation/PN:2024  Pain ratin  Subjective: Continues to be symptom free and denies symptom provocation this session.    Objective: Good postural control in upper quadrant with introduction of load handling with minimal cues required.      Assessment: Tolerating load handling without symptom provocation. Progressing towards goals.  Treatment today include Neuromuscular re-education, therapeutic exercises and therapeutic activities focusing on introducing load handling while maintaining postural control and noted improvement in improved postural control post session. Residual deficits continue to be noted in decreased joint and soft tissue mobility and will be addressed with continued skilled interventions.      Goals: In progress as follows:  Camela will demonstrate consistent postural awareness to allow 0 rest pain and reduce tissue irritability.  Camela will have improved cervical AROM to WNL and painfree to allow painfree work.  Camela will have improved scapular strength to 4/5 to allow painfree lifting and carrying.  Camela will be (I) with a home program to allow discharge from PT towards further self-recovery and maintenance of function.    Plan: Monitor for symptom recurrence and latent tissue irritation post-session.   Date: 2024  TX#:  Date: 2024           TX#: 3/8 Date:2024            TX#:  Date: 2024           TX#:   Date: 2024  Tx#:  Date: 2024   Tx#:  Date:   Tx#: 8/   THERAPEUTIC EX 23 mins 23 mins 23 mins 30 mins 30 mins 25 mins    Scifit UE only  L1 x 3 mins ea fwd/retro focus on upright posture L2 x 4 mins ea fwd/retro focusing on upright posture L3 x 4 mins ea fwd/retro focusing on  upright posture L3 x 4 mins ea fwd/retro focusing on upright posture L3 x 4 mins ea fwd/retro focusing on upright posture. L5 x 4 mins ea fwd/retro    Doorway stretch A and 90/90 3 x 30 secs ea         Scalene stretching 5 mins  5 mins       1st rib self mobilization with belt 10 ea side         Cervical SB stretch 3 mins         Cervical rotation stretch 3 mins         REIL  1x10        Prone upper back ext  2x10        Seated thoracic ext stretch  10 x 10 sec hold 2 x 10 with 1lb dowel UE 90/90 against foam roll       Scapular retraction with ER  Red tband 2x10 Yellow tband loop with back against foam roll 3x10 x 5 sec holed       Face pulls   Red cord 2x10 Red cord 3x10      Lat pull downs   Red cord 2x10 Red cord 3x10 Multi pull 10lbs 3x10 Multi pull 10lbs standing tandem on airex beam 3x10 ea    High rows    Red cord 3x10 Multi pull 10lbs 3x10     Low rows with ER    Red cord 3x10 Multi pull 10lbs 3x10     Open book    SL 3x10 ea Red tband SL 3x10 ea     SB push up plus     RSB 3x10 2lb ea UE 3x10 RSB    SB wall walk with bird dogs     RSB 3x10 2lb ea UE 3x10 RSB    Prone alt/upp UE/LE lifts     3x10               NEUROMUSCULAR RE-EDUCATION  10 mins   10 mins 8 mins 10 mins    Standing with forehead on ball against wall V, W, horizontal abd  2 x 10 ea  3x10      Standing (B) shoulder flexion/scaption and curl to press     1lb ea UE with back against foam roll 3x10 ea     Bird dogs      Prone over RSB contralateral/ipsilateral 3x10 ea    Wall clock      Red tband (B)UE 8/4, 9/3, 10/2, 11/1 alternate reaches 2x10              MANUAL TX 23 mins 8 mins 23 mins 8 mins 8 mins     STM Paracervicals/scalenes/UT Paracervicals/scalenes/UT Paracervicals/scalenes/UT       Joint mobilization Thoracic PA's/CT junction distraction Gr 3-4  Thoracic PA's/CT junction distraction Gr 3-4 Thoracic PA's/CT junction distraction Gr 3-4 Thoracic PA's/CT junction distraction Gr 3-4 Thoracic PA's/CT junction distraction Gr 3-4                THERAPEUTIC ACTIVITY      8 mins    woodchops      Standing on airex beam 3x10 ea 15 lbs multi pull    Cable retrowalk      10lbs 2 x 10ft    HEP: Continue current HEP.    Charges: Therapeutic exercise x 2; Therapeutic activity x 1; Neuromuscular re-education x 1    Total Timed Treatment: 43 min  Total Treatment Time: 43 min

## 2024-12-31 ENCOUNTER — OFFICE VISIT (OUTPATIENT)
Dept: PHYSICAL THERAPY | Age: 39
End: 2024-12-31
Payer: COMMERCIAL

## 2024-12-31 PROCEDURE — 97530 THERAPEUTIC ACTIVITIES: CPT | Performed by: PHYSICAL THERAPIST

## 2024-12-31 PROCEDURE — 97112 NEUROMUSCULAR REEDUCATION: CPT | Performed by: PHYSICAL THERAPIST

## 2024-12-31 PROCEDURE — 97110 THERAPEUTIC EXERCISES: CPT | Performed by: PHYSICAL THERAPIST

## 2024-12-31 NOTE — PROGRESS NOTES
Progress Summary  Pt has attended 8 visits in Physical Therapy.   Dx:   Diagnosis: Neck pain (M54.2)                 Insurance (Authorized # of Visits):  8           Authorizing Physician: Dr. Sanchez  Next MD visit: none scheduled  Fall Risk: standard         Precautions: n/a           Medication changes per patient? NO  Date of evaluation/PN:2024  Pain ratin  Assessment: Marissa has been responding well to her program with her symptoms well managed allowing her to tolerate progression of core and proximal stabilization exercises along with load handling to allow her to maintain her gains in PT. We are updating her plan of care to 10 total sessions with her last 2 remaining sessions to further upgrade her home program and allow successful discharge upon completion of those last 2 sessions towards being able to continue with her (I) HEP in maintaining her gains in PT and continue towards further self-recovery.  Treatment today include Neuromuscular re-education, therapeutic exercises and therapeutic activities focusing on progression of loaded core and proximal stabilization ex's and noted improvement in tolerance to load handling without symptom provocation post session. Updated POC towards further HEP progression and monitoring for symptom recurrence with further load handling progressions.    Subjective: Continues to have good symptom control with a busy holiday work and personal life schedule.    Objective: WNL and painfree active CROM and (B)UE strength grossly 4+ to 5/5; demonstrating tolerance to handling up to 15lb loads without symptom provocation.      Goals: In progress as follows:  Marissa will demonstrate consistent postural awareness to allow 0 rest pain and reduce tissue irritability.  Marissa will have improved cervical AROM to WNL and painfree to allow painfree work.  Marissa will have improved scapular strength to 4/5 to allow painfree lifting and carrying.  Marissa will be (I) with a home  program to allow discharge from PT towards further self-recovery and maintenance of function.    Treatment:   Date: 11/27/2024           TX#: 3/8 Date:12/2/2024            TX#: 4/8 Date: 12/4/2024           TX#: 5/8  Date: 12/9/2024  Tx#: 6/8 Date: 12/18/2024   Tx#: 7/8 Date: 12/31/2024  Tx#: 8/10   THERAPEUTIC EX 23 mins 23 mins 30 mins 30 mins 25 mins 30 mins   Scifit UE only  L2 x 4 mins ea fwd/retro focusing on upright posture L3 x 4 mins ea fwd/retro focusing on upright posture L3 x 4 mins ea fwd/retro focusing on upright posture L3 x 4 mins ea fwd/retro focusing on upright posture. L5 x 4 mins ea fwd/retro    Doorway stretch         Scalene stretching  5 mins       1st rib self mobilization with belt         Cervical SB stretch         Cervical rotation stretch         REIL 1x10        Prone upper back ext 2x10        Seated thoracic ext stretch 10 x 10 sec hold 2 x 10 with 1lb dowel UE 90/90 against foam roll       Scapular retraction with ER Red tband 2x10 Yellow tband loop with back against foam roll 3x10 x 5 sec holed       Face pulls  Red cord 2x10 Red cord 3x10   Red cord standing on airex 3x10   Lat pull downs  Red cord 2x10 Red cord 3x10 Multi pull 10lbs 3x10 Multi pull 10lbs standing tandem on airex beam 3x10 ea Multi pull 10lbs standing tandem on airex beam 3x10 ea green tband around knees   High rows   Red cord 3x10 Multi pull 10lbs 3x10     Low rows with ER   Red cord 3x10 Multi pull 10lbs 3x10     Open book   SL 3x10 ea Red tband SL 3x10 ea     SB push up plus    RSB 3x10 2lb ea UE 3x10 RSB RSB 3x10   SB wall walk with bird dogs    RSB 3x10 2lb ea UE 3x10 RSB RSB 3x10   Prone alt/upp UE/LE lifts    3x10  3x10 over RSB   Nustep UE/LE      L7 x 10 mins            NEUROMUSCULAR RE-EDUCATION 10 mins   10 mins 8 mins 10 mins 10 mins   Standing with forehead on ball against wall V, W, horizontal abd 2 x 10 ea  3x10      Standing (B) shoulder flexion/scaption and curl to press    1lb ea UE with back  against foam roll 3x10 ea     Bird dogs     Prone over RSB contralateral/ipsilateral 3x10 ea    Wall clock     Red tband (B)UE 8/4, 9/3, 10/2, 11/1 alternate reaches 2x10    Pallof press      Double red cord with step up to airex 3x10 ea   scarecrows      Red cord with green tband around knees on airex 3x10                     MANUAL TX 8 mins 23 mins 8 mins 8 mins     STM Paracervicals/scalenes/UT Paracervicals/scalenes/UT       Joint mobilization Thoracic PA's/CT junction distraction Gr 3-4 Thoracic PA's/CT junction distraction Gr 3-4 Thoracic PA's/CT junction distraction Gr 3-4 Thoracic PA's/CT junction distraction Gr 3-4              THERAPEUTIC ACTIVITY     8 mins 8 mins   woodchops     Standing on airex beam 3x10 ea 15 lbs multi pull Standing on airex beam 3x10 ea 15 lbs multi pull   Cable retrowalk     10lbs 2 x 10ft 10lbs monster walk green tband x 15   Reverse woodchops      Cable resistance standing on airex beam 3x10 ea   HEP: Continue current HEP.    Charges: Therapeutic exercise x 2; Therapeutic activity x 1; Neuromuscular re-education x 1    Total Timed Treatment: 43 min  Total Treatment Time: 43 min  Post Neck Disability Index Score  Post Score: (Patient-Rptd) 6 % (12/31/2024  8:13 AM)    10 % improvement    Plan: Continue skilled Physical Therapy 1 x/week or a total of 10 visits over a 90 day period. Treatment will include: therapeutic exercises, therapeutic activities and HEP instruction.       Marissa was advised of these findings, precautions, and treatment options and has agreed to actively participate in planning and for this course of care.    Thank you for your referral. If you have any questions, please contact me at Dept: [phone].    Sincerely,  Electronically signed by therapist: Lux Byrd PT     Physician's certification required:  Yes  Please co-sign or sign and return this letter via fax as soon as possible to [phone].   I certify the need for these services furnished under  this plan of treatment and while under my care.    X___________________________________________________ Date____________________    Certification From: 12/31/2024  To:3/31/2025

## 2025-01-08 ENCOUNTER — OFFICE VISIT (OUTPATIENT)
Dept: PHYSICAL THERAPY | Age: 40
End: 2025-01-08
Payer: COMMERCIAL

## 2025-01-08 ENCOUNTER — APPOINTMENT (OUTPATIENT)
Dept: PHYSICAL THERAPY | Age: 40
End: 2025-01-08
Payer: COMMERCIAL

## 2025-01-08 PROCEDURE — 97112 NEUROMUSCULAR REEDUCATION: CPT | Performed by: PHYSICAL THERAPIST

## 2025-01-08 PROCEDURE — 97530 THERAPEUTIC ACTIVITIES: CPT | Performed by: PHYSICAL THERAPIST

## 2025-01-08 PROCEDURE — 97110 THERAPEUTIC EXERCISES: CPT | Performed by: PHYSICAL THERAPIST

## 2025-01-08 NOTE — PROGRESS NOTES
Progress Summary  Pt has attended 8 visits in Physical Therapy.   Dx:   Diagnosis: Neck pain (M54.2)                 Insurance (Authorized # of Visits):  8           Authorizing Physician: Dr. Laura Lester MD visit: none scheduled  Fall Risk: standard         Precautions: n/a           Medication changes per patient? NO  Date of evaluation/PN:2024  Pain ratin    Subjective: No c/o pain reported and only had muscle soreness after her last session.    Objective: minimal cues required for posture control with functional movement patterns including load handling.    Assessment: Tolerating progression of resisted combined UE/LE movements/load handling without symptom provocation.  Treatment today include Neuromuscular re-education, therapeutic exercises and therapeutic activities focusing on progression of loaded core and proximal stabilization ex's and noted improvement in tolerance to load handling without symptom provocation post session. Updated POC towards further HEP progression and monitoring for symptom recurrence with further load handling progressions.    Goals: In progress as follows:  Camela will demonstrate consistent postural awareness to allow 0 rest pain and reduce tissue irritability.  Camela will have improved cervical AROM to WNL and painfree to allow painfree work.  Camela will have improved scapular strength to 4/5 to allow painfree lifting and carrying.  Camela will be (I) with a home program to allow discharge from PT towards further self-recovery and maintenance of function.    Treatment:   Date: 2024           TX#: 3/8 Date:2024            TX#:  Date: 2024           TX#:   Date: 2024  Tx#:  Date: 2024   Tx#:  Date: 2024  Tx#: 8/10 Date: 2024  Tx#: 8/10   THERAPEUTIC EX 23 mins 23 mins 30 mins 30 mins 25 mins 30 mins 30 mins   Scifit UE only  L2 x 4 mins ea fwd/retro focusing on upright posture L3 x 4 mins ea fwd/retro focusing on upright  posture L3 x 4 mins ea fwd/retro focusing on upright posture L3 x 4 mins ea fwd/retro focusing on upright posture. L5 x 4 mins ea fwd/retro  L5 x 4 mins ea fwd/retro   Doorway stretch          Scalene stretching  5 mins        1st rib self mobilization with belt          Cervical SB stretch          Cervical rotation stretch          REIL 1x10         Prone upper back ext 2x10         Seated thoracic ext stretch 10 x 10 sec hold 2 x 10 with 1lb dowel UE 90/90 against foam roll        Scapular retraction with ER Red tband 2x10 Yellow tband loop with back against foam roll 3x10 x 5 sec holed        Face pulls  Red cord 2x10 Red cord 3x10   Red cord standing on airex 3x10 Green cord with green tband around knees 3x10   Lat pull downs  Red cord 2x10 Red cord 3x10 Multi pull 10lbs 3x10 Multi pull 10lbs standing tandem on airex beam 3x10 ea Multi pull 10lbs standing tandem on airex beam 3x10 ea green tband around knees Multi pull 10lbs standing tandem on airex beam 3x10 ea green tband around knees   High rows   Red cord 3x10 Multi pull 10lbs 3x10   Green cord with green tband around knees 3x10   Low rows with ER   Red cord 3x10 Multi pull 10lbs 3x10   Green cord with green tband around knees 3x10   Open book   SL 3x10 ea Red tband SL 3x10 ea      SB push up plus    RSB 3x10 2lb ea UE 3x10 RSB RSB 3x10    SB wall walk with bird dogs    RSB 3x10 2lb ea UE 3x10 RSB RSB 3x10    Prone alt/upp UE/LE lifts    3x10  3x10 over RSB    Nustep UE/LE      L7 x 10 mins              NEUROMUSCULAR RE-EDUCATION 10 mins   10 mins 8 mins 10 mins 10 mins 10 mins   Standing with forehead on ball against wall V, W, horizontal abd 2 x 10 ea  3x10       Standing (B) shoulder flexion/scaption and curl to press    1lb ea UE with back against foam roll 3x10 ea      Bird dogs     Prone over RSB contralateral/ipsilateral 3x10 ea     Wall clock     Red tband (B)UE 8/4, 9/3, 10/2, 11/1 alternate reaches 2x10     Pallof press      Double red cord with  step up to airex 3x10 ea Black cord with 2 step out on airex beam 3x10 ea   scarecrows      Red cord with green tband around knees on airex 3x10 Red cord with green tband around knees on airex 3x10                       MANUAL TX 8 mins 23 mins 8 mins 8 mins      STM Paracervicals/scalenes/UT Paracervicals/scalenes/UT        Joint mobilization Thoracic PA's/CT junction distraction Gr 3-4 Thoracic PA's/CT junction distraction Gr 3-4 Thoracic PA's/CT junction distraction Gr 3-4 Thoracic PA's/CT junction distraction Gr 3-4                THERAPEUTIC ACTIVITY     8 mins 8 mins 10 mins   woodchops     Standing on airex beam 3x10 ea 15 lbs multi pull Standing on airex beam 3x10 ea 15 lbs multi pull Standing on airex beam 3x10 ea 15 lbs multi pull   Cable retrowalk     10lbs 2 x 10ft 10lbs monster walk green tband x 15 15lbs monster walk green tband 2 x 15   Reverse woodchops      Cable resistance standing on airex beam 3x10 ea Cable resistance standing on airex beam 3x10 ea   HEP: Continue current HEP.    Charges: Therapeutic exercise x 2; Therapeutic activity x 1; Neuromuscular re-education x 1    Total Timed Treatment: 50 min  Total Treatment Time: 50 min  Plan: Continue x 1 session then discharge with (I) HEP.

## 2025-01-22 ENCOUNTER — OFFICE VISIT (OUTPATIENT)
Dept: PHYSICAL THERAPY | Age: 40
End: 2025-01-22
Payer: COMMERCIAL

## 2025-01-22 ENCOUNTER — APPOINTMENT (OUTPATIENT)
Dept: PHYSICAL THERAPY | Age: 40
End: 2025-01-22
Payer: COMMERCIAL

## 2025-01-22 PROCEDURE — 97110 THERAPEUTIC EXERCISES: CPT | Performed by: PHYSICAL THERAPIST

## 2025-01-22 PROCEDURE — 97530 THERAPEUTIC ACTIVITIES: CPT | Performed by: PHYSICAL THERAPIST

## 2025-01-22 NOTE — PROGRESS NOTES
Discharge Summary  Pt has attended 10 visits in Physical Therapy.   Dx:   Diagnosis: Neck pain (M54.2)                 Insurance (Authorized # of Visits):  8           Authorizing Physician: Dr. Sanchez  Next MD visit: none scheduled  Fall Risk: standard         Precautions: n/a           Medication changes per patient? NO  Date of evaluation/PN:2024  Pain ratin  Assessment: Marissa has now completed her course of physical therapy with symptoms now only intermittently felt when laying in bed and needing to change positions while sleeping but otherwise symptom free during the day. She has restored WNL and painfree cervical and UE AROM and strength grossly 4+ to 5/5. She is (I) with a home program to maintain her gains in PT and continue towards further self-recovery. She has met her goals and is now discharged from PT.      Subjective: No c/o reported.    Objective: WNL and painfree active CROM and (B)UE with strength now grossly 4+ to 5/5 with good awareness of upper quadrant posture.        Goals: MET as follows:  Marissa will demonstrate consistent postural awareness to allow 0 rest pain and reduce tissue irritability.  Marissa will have improved cervical AROM to WNL and painfree to allow painfree work.  Marissa will have improved scapular strength to 4/5 to allow painfree lifting and carrying.  Marissa will be (I) with a home program to allow discharge from PT towards further self-recovery and maintenance of function.    Treatment:   Date: 2024           TX#:   Date: 2024  Tx#:  Date: 2024   Tx#:  Date: 2024  Tx#: 8/10 Date: 2024  Tx#: 9/10 Date: 2024  Tx#: 10/10   THERAPEUTIC EX 30 mins 30 mins 25 mins 30 mins 30 mins 30 mins   Scifit UE only  L3 x 4 mins ea fwd/retro focusing on upright posture L3 x 4 mins ea fwd/retro focusing on upright posture. L5 x 4 mins ea fwd/retro  L5 x 4 mins ea fwd/retro L5 x 4 mins ea fwd/retro   Doorway stretch         Scalene stretching          1st rib self mobilization with belt         Cervical SB stretch         Cervical rotation stretch         REIL         Prone upper back ext         Seated thoracic ext stretch         Scapular retraction with ER         Face pulls Red cord 3x10   Red cord standing on airex 3x10 Green cord with green tband around knees 3x10    Lat pull downs Red cord 3x10 Multi pull 10lbs 3x10 Multi pull 10lbs standing tandem on airex beam 3x10 ea Multi pull 10lbs standing tandem on airex beam 3x10 ea green tband around knees Multi pull 10lbs standing tandem on airex beam 3x10 ea green tband around knees Multi pull 15lbs standing tandem on airex beam 3x10 ea green tband around knees   High rows Red cord 3x10 Multi pull 10lbs 3x10   Green cord with green tband around knees 3x10    Low rows with ER Red cord 3x10 Multi pull 10lbs 3x10   Green cord with green tband around knees 3x10    Open book SL 3x10 ea Red tband SL 3x10 ea       SB push up plus  RSB 3x10 2lb ea UE 3x10 RSB RSB 3x10     SB wall walk with bird dogs  RSB 3x10 2lb ea UE 3x10 RSB RSB 3x10     Prone alt/upp UE/LE lifts  3x10  3x10 over RSB     Nustep UE/LE    L7 x 10 mins     Prone over SB      V, W, horizontal abd 2lbs ea UE 2x10   Pallof press      Blue tband with step outs 3x10            NEUROMUSCULAR RE-EDUCATION 10 mins 8 mins 10 mins 10 mins 10 mins    Standing with forehead on ball against wall V, W, horizontal abd 3x10        Standing (B) shoulder flexion/scaption and curl to press  1lb ea UE with back against foam roll 3x10 ea       Bird dogs   Prone over RSB contralateral/ipsilateral 3x10 ea      Wall clock   Red tband (B)UE 8/4, 9/3, 10/2, 11/1 alternate reaches 2x10      Pallof press    Double red cord with step up to airex 3x10 ea Black cord with 2 step out on airex beam 3x10 ea    scarecrows    Red cord with green tband around knees on airex 3x10 Red cord with green tband around knees on airex 3x10                      MANUAL TX 8 mins 8 mins       STM          Joint mobilization Thoracic PA's/CT junction distraction Gr 3-4 Thoracic PA's/CT junction distraction Gr 3-4                THERAPEUTIC ACTIVITY   8 mins 8 mins 10 mins 10 mins   woodchops   Standing on airex beam 3x10 ea 15 lbs multi pull Standing on airex beam 3x10 ea 15 lbs multi pull Standing on airex beam 3x10 ea 15 lbs multi pull Standing on airex beam 3x10 ea 15 lbs multi pull   Cable retrowalk   10lbs 2 x 10ft 10lbs monster walk green tband x 15 15lbs monster walk green tband 2 x 15    Reverse woodchops    Cable resistance standing on airex beam 3x10 ea Cable resistance standing on airex beam 3x10 ea Blue tband 3x10 ea   OH ball bounce      4lbs x 30   HEP: Refer to patient instructions.    Charges: Therapeutic exercise x 2; Therapeutic activity x 1   Total Timed Treatment: 50 min  Total Treatment Time: 50 min  Post Neck Disability Index Score  Post Score: (Patient-Rptd) 2 % (1/22/2025  8:30 AM)    14 % improvement    Plan: Discharge with (I) HEP.    Thank you for your referral. If you have any questions, please contact me at Dept: [phone].    Sincerely,  Electronically signed by therapist: Lux Byrd, PT

## 2025-01-24 ENCOUNTER — LAB ENCOUNTER (OUTPATIENT)
Dept: LAB | Age: 40
End: 2025-01-24
Payer: COMMERCIAL

## 2025-01-24 ENCOUNTER — OFFICE VISIT (OUTPATIENT)
Dept: FAMILY MEDICINE CLINIC | Facility: CLINIC | Age: 40
End: 2025-01-24
Payer: COMMERCIAL

## 2025-01-24 VITALS
BODY MASS INDEX: 31.53 KG/M2 | RESPIRATION RATE: 18 BRPM | TEMPERATURE: 99 F | WEIGHT: 167 LBS | DIASTOLIC BLOOD PRESSURE: 69 MMHG | SYSTOLIC BLOOD PRESSURE: 105 MMHG | HEART RATE: 78 BPM | HEIGHT: 61.2 IN

## 2025-01-24 DIAGNOSIS — Z11.3 SCREEN FOR STD (SEXUALLY TRANSMITTED DISEASE): ICD-10-CM

## 2025-01-24 DIAGNOSIS — E61.1 IRON DEFICIENCY: ICD-10-CM

## 2025-01-24 DIAGNOSIS — Z01.419 ENCOUNTER FOR GYNECOLOGICAL EXAMINATION: Primary | ICD-10-CM

## 2025-01-24 DIAGNOSIS — F41.1 GAD (GENERALIZED ANXIETY DISORDER): ICD-10-CM

## 2025-01-24 LAB
BASOPHILS # BLD AUTO: 0.04 X10(3) UL (ref 0–0.2)
BASOPHILS NFR BLD AUTO: 0.5 %
DEPRECATED HBV CORE AB SER IA-ACNC: 70 NG/ML
DEPRECATED RDW RBC AUTO: 38 FL (ref 35.1–46.3)
EOSINOPHIL # BLD AUTO: 0.02 X10(3) UL (ref 0–0.7)
EOSINOPHIL NFR BLD AUTO: 0.2 %
ERYTHROCYTE [DISTWIDTH] IN BLOOD BY AUTOMATED COUNT: 11.9 % (ref 11–15)
HCT VFR BLD AUTO: 40 %
HGB BLD-MCNC: 13.4 G/DL
IMM GRANULOCYTES # BLD AUTO: 0.01 X10(3) UL (ref 0–1)
IMM GRANULOCYTES NFR BLD: 0.1 %
LYMPHOCYTES # BLD AUTO: 1.39 X10(3) UL (ref 1–4)
LYMPHOCYTES NFR BLD AUTO: 16.9 %
MCH RBC QN AUTO: 29.3 PG (ref 26–34)
MCHC RBC AUTO-ENTMCNC: 33.5 G/DL (ref 31–37)
MCV RBC AUTO: 87.5 FL
MONOCYTES # BLD AUTO: 0.45 X10(3) UL (ref 0.1–1)
MONOCYTES NFR BLD AUTO: 5.5 %
NEUTROPHILS # BLD AUTO: 6.33 X10 (3) UL (ref 1.5–7.7)
NEUTROPHILS # BLD AUTO: 6.33 X10(3) UL (ref 1.5–7.7)
NEUTROPHILS NFR BLD AUTO: 76.8 %
PLATELET # BLD AUTO: 254 10(3)UL (ref 150–450)
RBC # BLD AUTO: 4.57 X10(6)UL
WBC # BLD AUTO: 8.2 X10(3) UL (ref 4–11)

## 2025-01-24 PROCEDURE — 87491 CHLMYD TRACH DNA AMP PROBE: CPT

## 2025-01-24 PROCEDURE — 85025 COMPLETE CBC W/AUTO DIFF WBC: CPT

## 2025-01-24 PROCEDURE — 82728 ASSAY OF FERRITIN: CPT

## 2025-01-24 PROCEDURE — 87591 N.GONORRHOEAE DNA AMP PROB: CPT

## 2025-01-24 PROCEDURE — 87624 HPV HI-RISK TYP POOLED RSLT: CPT

## 2025-01-24 PROCEDURE — 36415 COLL VENOUS BLD VENIPUNCTURE: CPT

## 2025-01-24 RX ORDER — CITALOPRAM HYDROBROMIDE 10 MG/1
10 TABLET ORAL DAILY
Qty: 90 TABLET | Refills: 1 | Status: CANCELLED | OUTPATIENT
Start: 2025-01-24

## 2025-01-24 RX ORDER — CITALOPRAM HYDROBROMIDE 10 MG/1
10 TABLET ORAL DAILY
Qty: 90 TABLET | Refills: 1 | Status: SHIPPED | OUTPATIENT
Start: 2025-01-24

## 2025-01-24 NOTE — PROGRESS NOTES
HPI:    Patient ID: Marissa Joshi is a 39 year old female.    HPI     Patient here in office for Pap.  Last Pap completed in 2022 and was normal. Denies concern for STI's.     Needs refill of citalopram 10 mg daily for anxiety. Had stopped medication several months ago, interested in restarting due to increased anxiety symptoms.     Review of Systems   Constitutional: Negative.    Respiratory: Negative.     Cardiovascular: Negative.    Genitourinary: Negative.    Skin: Negative.    Neurological: Negative.    Psychiatric/Behavioral:  The patient is nervous/anxious.             Current Outpatient Medications   Medication Sig Dispense Refill    citalopram 10 MG Oral Tab Take 1 tablet (10 mg total) by mouth daily. 90 tablet 1    methocarbamol 750 MG Oral Tab Take 1 tablet (750 mg total) by mouth nightly as needed. (Patient not taking: Reported on 1/24/2025) 15 tablet 0    naproxen 500 MG Oral Tab Take 1 tablet (500 mg total) by mouth 2 (two) times daily with meals. (Patient not taking: Reported on 1/24/2025) 28 tablet 0    triamcinolone 0.1 % External Cream Apply topically 2 (two) times daily as needed. (Patient not taking: Reported on 9/11/2024) 45 g 0    Ferrous Sulfate 325 (65 Fe) MG Oral Tab Take 1 tablet (325 mg total) by mouth daily with breakfast. (Patient not taking: Reported on 8/7/2024) 30 tablet 0     Allergies:Allergies[1]   /69   Pulse 78   Temp 98.5 °F (36.9 °C) (Temporal)   Resp 18   Ht 5' 1.2\" (1.554 m)   Wt 167 lb (75.8 kg)   LMP 01/07/2025 (Exact Date)   BMI 31.35 kg/m²   Body mass index is 31.35 kg/m².  PHYSICAL EXAM:   Physical Exam  Vitals reviewed.   Constitutional:       General: She is not in acute distress.     Appearance: Normal appearance. She is not ill-appearing.   Cardiovascular:      Rate and Rhythm: Normal rate and regular rhythm.      Heart sounds: Normal heart sounds. No murmur heard.     No friction rub. No gallop.   Pulmonary:      Effort: Pulmonary effort is normal. No  respiratory distress.      Breath sounds: No stridor. No wheezing, rhonchi or rales.   Chest:      Chest wall: No tenderness.   Genitourinary:     General: Normal vulva.      Vagina: No vaginal discharge.   Skin:     General: Skin is warm.      Findings: No rash.   Neurological:      General: No focal deficit present.      Mental Status: She is alert and oriented to person, place, and time.   Psychiatric:         Mood and Affect: Mood normal.         Behavior: Behavior normal.         Thought Content: Thought content normal.         Judgment: Judgment normal.                ASSESSMENT/PLAN:   1. Encounter for gynecological examination  - ThinPrep PAP Smear; Future  - Hpv Dna  High Risk , Thin Prep Collect; Future  - ThinPrep PAP Smear  - Hpv Dna  High Risk , Thin Prep Collect    2. LISA (generalized anxiety disorder)  - citalopram 10 MG Oral Tab,  Take 1 tablet (10 mg total) by mouth daily.     3. Screen for STD (sexually transmitted disease)  - Chlamydia/Gc Amplification [E]; Future  - Chlamydia/Gc Amplification [E]      Orders Placed This Encounter   Procedures    Hpv Dna  High Risk , Thin Prep Collect    ThinPrep PAP Smear    Chlamydia/Gc Amplification [E]       Meds This Visit:  Requested Prescriptions     Signed Prescriptions Disp Refills    citalopram 10 MG Oral Tab 90 tablet 1     Sig: Take 1 tablet (10 mg total) by mouth daily.       Imaging & Referrals:  None         ID#[id number]         [1]   Allergies  Allergen Reactions    Seasonal ITCHING

## 2025-01-27 LAB
C TRACH DNA SPEC QL NAA+PROBE: NEGATIVE
HPV E6+E7 MRNA CVX QL NAA+PROBE: NEGATIVE
N GONORRHOEA DNA SPEC QL NAA+PROBE: NEGATIVE

## 2025-02-15 ENCOUNTER — OFFICE VISIT (OUTPATIENT)
Dept: FAMILY MEDICINE CLINIC | Facility: CLINIC | Age: 40
End: 2025-02-15
Payer: COMMERCIAL

## 2025-02-15 VITALS
OXYGEN SATURATION: 97 % | HEIGHT: 61.2 IN | TEMPERATURE: 97 F | BODY MASS INDEX: 30.4 KG/M2 | WEIGHT: 161 LBS | HEART RATE: 66 BPM | SYSTOLIC BLOOD PRESSURE: 101 MMHG | DIASTOLIC BLOOD PRESSURE: 62 MMHG

## 2025-02-15 DIAGNOSIS — M79.644 PAIN OF RIGHT THUMB: Primary | ICD-10-CM

## 2025-02-15 DIAGNOSIS — F41.9 ANXIETY: ICD-10-CM

## 2025-02-15 NOTE — PROGRESS NOTES
HPI:    Patient ID: Marissa Joshi is a 39 year old female.    HPI     Patient here in office with complaint of right thumb paresthesia/pain with swelling, started 1 week ago.  Denies known injury.      Also complains of increased anxiety due to recent job loss.  Has also lost weight recently unintentionally.  Reports poor appetite.   Has been attending [employer] services regularly to help her cope with recent job loss.   Currently taking citalopram 10 mg daily.        Review of Systems   Constitutional: Negative.    Respiratory: Negative.     Cardiovascular: Negative.    Musculoskeletal:         Right thumb pain/paresthesia   Skin: Negative.    Neurological: Negative.    Psychiatric/Behavioral: Negative.              Current Outpatient Medications   Medication Sig Dispense Refill    citalopram 10 MG Oral Tab Take 1 tablet (10 mg total) by mouth daily. 90 tablet 1     Allergies:Allergies[1]   /62   Pulse 66   Temp 96.8 °F (36 °C) (Temporal)   Ht 5' 1.2\" (1.554 m)   Wt 161 lb (73 kg)   LMP 02/12/2025 (Exact Date)   SpO2 97%   BMI 30.22 kg/m²   Body mass index is 30.22 kg/m².  PHYSICAL EXAM:   Physical Exam  Vitals reviewed.   Constitutional:       General: She is not in acute distress.     Appearance: Normal appearance. She is not ill-appearing.   Cardiovascular:      Rate and Rhythm: Normal rate and regular rhythm.      Heart sounds: Normal heart sounds. No murmur heard.     No friction rub. No gallop.   Pulmonary:      Effort: Pulmonary effort is normal. No respiratory distress.      Breath sounds: Normal breath sounds. No stridor. No wheezing, rhonchi or rales.   Chest:      Chest wall: No tenderness.   Musculoskeletal:         General: Tenderness present.      Comments: Moderate right thumb swelling with tenderness.  Negative Finkelstein test   Skin:     General: Skin is warm.      Findings: No rash.   Neurological:      General: No focal deficit present.      Mental Status: She is alert and oriented to  person, place, and time.   Psychiatric:         Mood and Affect: Mood normal.         Behavior: Behavior normal.         Thought Content: Thought content normal.         Judgment: Judgment normal.      Comments: Teary-eyed during office visit                ASSESSMENT/PLAN:   1. Pain of right thumb  -Suspect tendinitis versus other  - Advised patient to take ibuprofen, Advil, or Aleve, 1-2 times daily x 1 week  - Wrist brace with thumb abductor given in office.  Advised patient to wear during day as tolerated, sleep with wrist brace at night x 1 week    2. Anxiety  -Discussed increasing dose of citalopram, patient declines  - Will return to office if symptoms worsening/not improving      No orders of the defined types were placed in this encounter.      Meds This Visit:  Requested Prescriptions      No prescriptions requested or ordered in this encounter       Imaging & Referrals:  None         ID#[id number]         [1]   Allergies  Allergen Reactions    Seasonal ITCHING

## 2025-02-15 NOTE — PATIENT INSTRUCTIONS
Take Advil, Aleve, or Ibuprofen 1-2 times daily x one week  Wear wrist brace during the day as tolerated, wear at night while sleeping x one week